# Patient Record
Sex: MALE | Race: WHITE | Employment: OTHER | URBAN - METROPOLITAN AREA
[De-identification: names, ages, dates, MRNs, and addresses within clinical notes are randomized per-mention and may not be internally consistent; named-entity substitution may affect disease eponyms.]

---

## 2017-04-12 DIAGNOSIS — E78.5 HYPERLIPIDEMIA, UNSPECIFIED HYPERLIPIDEMIA TYPE: ICD-10-CM

## 2017-04-12 DIAGNOSIS — E83.52 SERUM CALCIUM ELEVATED: ICD-10-CM

## 2017-04-12 DIAGNOSIS — E78.00 PURE HYPERCHOLESTEROLEMIA: Primary | ICD-10-CM

## 2017-04-12 DIAGNOSIS — R74.8 ELEVATED LIVER ENZYMES: ICD-10-CM

## 2017-05-06 DIAGNOSIS — F43.0 STRESS REACTION: ICD-10-CM

## 2017-05-06 DIAGNOSIS — F41.9 ANXIETY: ICD-10-CM

## 2017-05-15 ENCOUNTER — OFFICE VISIT (OUTPATIENT)
Dept: FAMILY MEDICINE CLINIC | Age: 57
End: 2017-05-15

## 2017-05-15 VITALS
HEART RATE: 53 BPM | BODY MASS INDEX: 25.96 KG/M2 | WEIGHT: 161.5 LBS | SYSTOLIC BLOOD PRESSURE: 119 MMHG | TEMPERATURE: 97 F | RESPIRATION RATE: 20 BRPM | HEIGHT: 66 IN | DIASTOLIC BLOOD PRESSURE: 80 MMHG

## 2017-05-15 DIAGNOSIS — R05.9 COUGH: ICD-10-CM

## 2017-05-15 DIAGNOSIS — F43.0 STRESS REACTION: ICD-10-CM

## 2017-05-15 DIAGNOSIS — F41.9 ANXIETY: Primary | ICD-10-CM

## 2017-05-15 RX ORDER — SERTRALINE HYDROCHLORIDE 50 MG/1
TABLET, FILM COATED ORAL
Qty: 90 TAB | Refills: 1 | Status: SHIPPED | OUTPATIENT
Start: 2017-05-15

## 2017-05-15 NOTE — PROGRESS NOTES
Chief Complaint   Patient presents with    Hypogonadism    Anxiety     stress       Health Maintenance reviewed     1. Have you been to the ER, urgent care clinic since your last visit? Hospitalized since your last visit? No    2. Have you seen or consulted any other health care providers outside of the [facility] since your last visit? Include any pap smears or colon screening.  No

## 2017-05-15 NOTE — MR AVS SNAPSHOT
Visit Information Date & Time Provider Department Dept. Phone Encounter #  
 5/15/2017  1:15 PM Tk Klein MD [address] [phone] Follow-up Instructions Return in about 3 months (around 8/15/2017). Upcoming Health Maintenance Date Due DTaP/Tdap/Td series (1 - Tdap) 5/19/1980 FOBT Q 1 YEAR AGE 50-75 5/19/2009 INFLUENZA AGE 9 TO ADULT 8/1/2017 Allergies as of 5/15/2017  Review Complete On: 5/15/2017 By: Tk Klein MD  
 No Known Allergies Current Immunizations  Never Reviewed Name Date Influenza Vaccine (Quad) PF 8/23/2016 Not reviewed this visit You Were Diagnosed With   
  
 Codes Comments Anxiety    -  Primary ICD-10-CM: F41.9 ICD-9-CM: 300.00 Stress reaction     ICD-10-CM: F43.0 ICD-9-CM: 308.9 Cough     ICD-10-CM: R05 ICD-9-CM: 239. 2 Vitals BP Pulse Temp Resp Height(growth percentile) Weight(growth percentile) 119/80 (BP 1 Location: Right arm, BP Patient Position: Sitting) (!) 53 97 °F (36.1 °C) (Oral) 20 5' 6\" (1.676 m) 161 lb 8 oz (73.3 kg) BMI Smoking Status 26.07 kg/m2 Never Smoker BMI and BSA Data Body Mass Index Body Surface Area 26.07 kg/m 2 1.85 m 2 Preferred Pharmacy Pharmacy Name Phone [phone] [facility] [place], [address] [phone] Your Updated Medication List  
  
   
This list is accurate as of: 5/15/17  2:24 PM.  Always use your most recent med list.  
  
  
  
  
 aspirin delayed-release 81 mg tablet Take  by mouth daily. ibuprofen 800 mg tablet Commonly known as:  MOTRIN Take 1 Tab by mouth every eight (8) hours as needed for Pain. sertraline 50 mg tablet Commonly known as:  ZOLOFT  
TAKE 1 TABLET BY MOUTH DAILY Follow-up Instructions Return in about 3 months (around 8/15/2017). Patient Instructions Please contact our office if you have any questions about your visit today. Introducing [facility]! Dear Maru Bennett: Thank you for requesting a [employer] account. Our records indicate that you already have an active [employer] account. You can access your account anytime at [email or web address] Did you know that you can access your hospital and ER discharge instructions at any time in [employer]? You can also review all of your test results from your hospital stay or ER visit. Additional Information If you have questions, please visit the Frequently Asked Questions section of the [employer] website at [email or web address] Remember, Friendsurance is NOT to be used for urgent needs. For medical emergencies, dial 911. Now available from your iPhone and Android! Please provide this summary of care documentation to your next provider. Your primary care clinician is listed as REUBEN MEDINA. If you have any questions after today's visit, please call [phone].

## 2017-05-15 NOTE — PROGRESS NOTES
HISTORY OF PRESENT ILLNESS  Heri Lynch is a 61 y.o. male. Chief Complaint   Patient presents with    Hypogonadism    Anxiety     stress     improved stress some with retiring but still is an anxious person. Not taking zoloft as thought did not need  complains of cough, new no med tried, no f/c sob  HPI  Past Medical History:   Diagnosis Date    Cat allergies     Dander    Chest pain, unspecified     Elevated cholesterol 2010    diet    Hypercalcemia     Pure hypercholesterolemia 11/21/2016 8/16 [id number], HDL88 ASCVD risk 4.7% in 10 yrs- diet good for now, no statins    Skin problem      Current Outpatient Prescriptions   Medication Sig Dispense Refill    sertraline (ZOLOFT) 50 mg tablet TAKE 1 TABLET BY MOUTH DAILY 90 Tab 1    aspirin delayed-release 81 mg tablet Take  by mouth daily.  ibuprofen (MOTRIN) 800 mg tablet Take 1 Tab by mouth every eight (8) hours as needed for Pain. 60 Tab 0     No Known Allergies    ROS  Visit Vitals    /80 (BP 1 Location: Right arm, BP Patient Position: Sitting)    Pulse (!) 53    Temp 97 °F (36.1 °C) (Oral)    Resp 20    Ht 5' 6\" (1.676 m)    Wt 161 lb 8 oz (73.3 kg)    BMI 26.07 kg/m2       Physical Exam  Nursing note and vitals reviewed. Constitutional: He is oriented to person, place, and time. He appears well-developed and well-nourished. No distress. HENT:   Mouth/Throat: Oropharynx is clear and moist.   Neck: No JVD present. No thyromegaly present. Cardiovascular: Normal rate, regular rhythm and normal heart sounds. Pulmonary/Chest: Effort normal and breath sounds normal. No respiratory distress. He has no wheezes. He has no rales. Abdominal: Soft. Bowel sounds are normal. He exhibits no distension. There is no tenderness. There is no rebound. Musculoskeletal: He exhibits no edema and no tenderness. Lymphadenopathy:     He has no cervical adenopathy. Neurological: He is alert and oriented to person, place, and time. Skin: No pallor. Psychiatric: He has a normal mood and affect. His behavior is normal.    ASSESSMENT and PLAN    ICD-10-CM ICD-9-CM    1. Anxiety improved at present but still encourage compliance with med to see if overall helpful F41.9 300.00    2. Stress reaction F43.0 308.9    3. Cough likey benign R05 786. 2    Visit based of time 25 minutes total,  with more than 50% of the face-to-face visit time spent in counseling on Depression, it's treatment, prognosis, management advise, plan and follow-up recommendations. Follow-up Disposition:  Return in about 3 months (around 8/15/2017) for labs and cpe.

## 2017-06-07 DIAGNOSIS — F41.9 ANXIETY: ICD-10-CM

## 2017-06-07 DIAGNOSIS — F43.0 STRESS REACTION: ICD-10-CM

## 2017-06-07 RX ORDER — SERTRALINE HYDROCHLORIDE 50 MG/1
TABLET, FILM COATED ORAL
Qty: 30 TAB | Refills: 0 | Status: SHIPPED | OUTPATIENT
Start: 2017-06-07 | End: 2017-09-26 | Stop reason: SDUPTHER

## 2017-07-12 ENCOUNTER — HOSPITAL ENCOUNTER (OUTPATIENT)
Dept: OTHER | Age: 57
Discharge: HOME | End: 2017-07-12
Payer: COMMERCIAL

## 2017-07-12 DIAGNOSIS — M79.673 FOOT PAIN: ICD-10-CM

## 2017-07-12 PROCEDURE — 73620 X-RAY EXAM OF FOOT: CPT

## 2017-07-28 ENCOUNTER — HOSPITAL ENCOUNTER (OUTPATIENT)
Dept: MRI IMAGING | Age: 57
Discharge: HOME | End: 2017-07-28
Attending: ORTHOPAEDIC SURGERY
Payer: COMMERCIAL

## 2017-07-28 DIAGNOSIS — M76.829 POSTERIOR TIBIAL TENDINITIS: ICD-10-CM

## 2017-07-28 PROCEDURE — 73721 MRI JNT OF LWR EXTRE W/O DYE: CPT

## 2017-09-26 ENCOUNTER — OFFICE VISIT (OUTPATIENT)
Dept: FAMILY MEDICINE CLINIC | Age: 57
End: 2017-09-26

## 2017-09-26 VITALS
HEART RATE: 66 BPM | SYSTOLIC BLOOD PRESSURE: 135 MMHG | RESPIRATION RATE: 20 BRPM | DIASTOLIC BLOOD PRESSURE: 83 MMHG | HEIGHT: 66 IN | TEMPERATURE: 97.8 F

## 2017-09-26 DIAGNOSIS — M10.9 ACUTE GOUT OF LEFT FOOT, UNSPECIFIED CAUSE: ICD-10-CM

## 2017-09-26 DIAGNOSIS — Z51.81 MEDICATION MONITORING ENCOUNTER: ICD-10-CM

## 2017-09-26 DIAGNOSIS — M19.079 ARTHRITIS OF ANKLE OR FOOT, DEGENERATIVE, UNSPECIFIED LATERALITY: ICD-10-CM

## 2017-09-26 DIAGNOSIS — Z98.890 S/P SURGICAL MANIPULATION OF ANKLE JOINT: ICD-10-CM

## 2017-09-26 DIAGNOSIS — R74.8 ELEVATED LIVER ENZYMES: ICD-10-CM

## 2017-09-26 DIAGNOSIS — Z00.00 VISIT FOR WELL MAN HEALTH CHECK: Primary | ICD-10-CM

## 2017-09-26 DIAGNOSIS — Z23 ENCOUNTER FOR IMMUNIZATION: ICD-10-CM

## 2017-09-26 DIAGNOSIS — C44.91 BASAL CELL CARCINOMA: ICD-10-CM

## 2017-09-26 DIAGNOSIS — Z98.890 S/P FOOT SURGERY, RIGHT: ICD-10-CM

## 2017-09-26 DIAGNOSIS — S82.891S ANKLE FRACTURE, RIGHT, SEQUELA: ICD-10-CM

## 2017-09-26 DIAGNOSIS — Z12.11 SCREEN FOR COLON CANCER: ICD-10-CM

## 2017-09-26 DIAGNOSIS — Z98.890 H/O COLONOSCOPY: ICD-10-CM

## 2017-09-26 DIAGNOSIS — D35.1 PARATHYROID ADENOMA: ICD-10-CM

## 2017-09-26 DIAGNOSIS — Z12.5 SCREENING FOR PROSTATE CANCER: ICD-10-CM

## 2017-09-26 DIAGNOSIS — Z13.21 ENCOUNTER FOR VITAMIN DEFICIENCY SCREENING: ICD-10-CM

## 2017-09-26 DIAGNOSIS — E78.5 HYPERLIPIDEMIA, UNSPECIFIED HYPERLIPIDEMIA TYPE: ICD-10-CM

## 2017-09-26 DIAGNOSIS — Z13.820 SCREENING FOR OSTEOPOROSIS: ICD-10-CM

## 2017-09-26 RX ORDER — PROMETHAZINE HYDROCHLORIDE 12.5 MG/1
TABLET ORAL
Refills: 0 | COMMUNITY
Start: 2017-09-11 | End: 2017-11-07 | Stop reason: ALTCHOICE

## 2017-09-26 RX ORDER — OXYCODONE HYDROCHLORIDE 5 MG/1
TABLET ORAL
Refills: 0 | COMMUNITY
Start: 2017-09-11 | End: 2017-11-07 | Stop reason: ALTCHOICE

## 2017-09-26 RX ORDER — KETOROLAC TROMETHAMINE 10 MG/1
TABLET, FILM COATED ORAL
Refills: 0 | COMMUNITY
Start: 2017-09-22 | End: 2017-11-07 | Stop reason: ALTCHOICE

## 2017-09-26 RX ORDER — DOCUSATE SODIUM 100 MG/1
CAPSULE, LIQUID FILLED ORAL
Refills: 1 | COMMUNITY
Start: 2017-09-11

## 2017-09-26 RX ORDER — COLCHICINE 0.6 MG/1
TABLET ORAL
Refills: 0 | COMMUNITY
Start: 2017-09-22 | End: 2017-11-07 | Stop reason: SDUPTHER

## 2017-09-26 NOTE — PROGRESS NOTES
HISTORY OF PRESENT ILLNESS  Heri Deng is a 61 y.o. male. Actually patient was here for physical but has numerous other issues. Hyperlipidemia chronic problem control uncertain as labs are well past due. History of parathyroid adenoma status post resection and patient is concerned about his bones. He had a recent fracture of the ankle found with severe foot and ankle pain and has had foot surgery and is now in a walking boot and physical therapy. The patient feels better but he is concerned about the status of his bones and is asking about testing to be done for this. He was also found to have gout because he went back for another episode of severe pain. HPI  Past Medical History:   Diagnosis Date    Cat allergies     Dander    Chest pain, unspecified     Elevated cholesterol 2010    diet    Hypercalcemia     Pure hypercholesterolemia 11/21/2016 8/16 [id number], HDL88 ASCVD risk 4.7% in 10 yrs- diet good for now, no statins    Skin problem      Current Outpatient Prescriptions   Medication Sig Dispense Refill    sertraline (ZOLOFT) 50 mg tablet TAKE 1 TABLET BY MOUTH DAILY 90 Tab 1    aspirin delayed-release 81 mg tablet Take  by mouth daily.  colchicine 0.6 mg tablet TK 2 TS  PO AT INITIAL START OF  FLARE. REPEAT WITH 1 T 1 HOUR AFTER FIRST DOSE IF NEEDED  0    ketorolac (TORADOL) 10 mg tablet TK 1 T PO TID UTD  0     mg capsule TK 1 C PO BID  1    oxyCODONE IR (ROXICODONE) 5 mg immediate release tablet TK 1 TO 2 TS PO Q 4 TO 6 H PRF POST-OP PAIN  0    promethazine (PHENERGAN) 12.5 mg tablet TK 1 T PO  Q 6 H  0     No Known Allergies    Review of Systems   Constitutional: Negative for chills and fever. Respiratory: Negative for shortness of breath. Cardiovascular: Negative for chest pain. Musculoskeletal: Positive for joint pain. All other systems reviewed and are negative.     Visit Vitals    /83 (BP 1 Location: Right arm, BP Patient Position: Sitting)    Pulse 66    Temp 97.8 °F (36.6 °C) (Oral)    Resp 20    Ht 5' 6\" (1.676 m)      Physical Exam  Nursing note and vitals reviewed. Constitutional: He is oriented to person, place, and time. He appears well-developed and well-nourished. No distress. HENT:   Mouth/Throat: Oropharynx is clear and moist.   Neck: No JVD present. No thyromegaly present. Cardiovascular: Normal rate, regular rhythm and normal heart sounds. Pulmonary/Chest: Effort normal and breath sounds normal. No respiratory distress. He has no wheezes. He has no rales. Abdominal: Soft. Bowel sounds are normal. He exhibits no distension. There is no tenderness. There is no rebound. Musculoskeletal: He exhibits no edema and no tenderness. Walking boot  Lymphadenopathy:     He has no cervical adenopathy. Neurological: He is alert and oriented to person, place, and time. Skin: No pallor. Psychiatric: He has a normal mood and affect. His behavior is normal.     ASSESSMENT and PLAN    ICD-10-CM ICD-9-CM    6. Ankle fracture, right, sequela  recent spurious ankle fracture, h/o parathyroid adenoma. s/p resection, screening for osteoporosis   S82.891S 905.4 DEXA BONE DENSITY STUDY AXIAL      VITAMIN D, 25 HYDROXY   7. S/P surgical manipulation of ankle joint Z98.890 V45.89    8. S/P foot surgery, right Z98.890 V45.89    9. Arthritis of ankle or foot, degenerative, unspecified laterality M19.079 715.97    10. Acute gout of left foot, unspecified cause U42.7 858.18 METABOLIC PANEL, COMPREHENSIVE      CBC WITH AUTOMATED DIFF   11. Hyperlipidemia, unspecified hyperlipidemia type E78.5 272.4 LIPID PANEL      METABOLIC PANEL, COMPREHENSIVE      CBC WITH AUTOMATED DIFF      VITAMIN B12      TSH 3RD GENERATION      T4, FREE      MAGNESIUM   12. Elevated liver enzymes A29.5 598.1 METABOLIC PANEL, COMPREHENSIVE      CBC WITH AUTOMATED DIFF      VITAMIN B12      MAGNESIUM   13.  Parathyroid adenoma, S/P resection 11/2013 D35.1 227.1 DEXA BONE DENSITY STUDY AXIAL      METABOLIC PANEL, COMPREHENSIVE      CBC WITH AUTOMATED DIFF      VITAMIN B12      TSH 3RD GENERATION      T4, FREE      MAGNESIUM   14. Basal cell carcinoma, upper chest C44.91 173.91    15. Medication monitoring encounter D00.26 O56.67 METABOLIC PANEL, COMPREHENSIVE      CBC WITH AUTOMATED DIFF      VITAMIN B12      TSH 3RD GENERATION      T4, FREE      MAGNESIUM   16. Screening for osteoporosis Z13.820 V82.81 DEXA BONE DENSITY STUDY AXIAL      METABOLIC PANEL, COMPREHENSIVE   17. Encounter for vitamin deficiency screening Z13.21 V77.99 VITAMIN B12      MAGNESIUM      VITAMIN D, 25 HYDROXY     Visit based of time 45 minutes total, in addition to cpe with more than 50% of the face-to-face visit time spent in counseling on numerous issues above, foot ankle surgery and fx, record review, chronic rproblems, it's treatment, prognosis, management advise, plan and follow-up recommendations. Subjective:   Heri Robertson. is a 61 y.o. male presenting for his annual checkup. ROS:  Feeling well. No dyspnea or chest pain on exertion. No abdominal pain, change in bowel habits, black or bloody stools. No urinary tract or prostatic symptoms. No neurological complaints. Specific concerns today: see additional note. Current Outpatient Prescriptions   Medication Sig Dispense Refill    sertraline (ZOLOFT) 50 mg tablet TAKE 1 TABLET BY MOUTH DAILY 90 Tab 1    aspirin delayed-release 81 mg tablet Take  by mouth daily.  colchicine 0.6 mg tablet TK 2 TS  PO AT INITIAL START OF  FLARE.  REPEAT WITH 1 T 1 HOUR AFTER FIRST DOSE IF NEEDED  0    ketorolac (TORADOL) 10 mg tablet TK 1 T PO TID UTD  0     mg capsule TK 1 C PO BID  1    oxyCODONE IR (ROXICODONE) 5 mg immediate release tablet TK 1 TO 2 TS PO Q 4 TO 6 H PRF POST-OP PAIN  0    promethazine (PHENERGAN) 12.5 mg tablet TK 1 T PO  Q 6 H  0     No Known Allergies  Past Medical History:   Diagnosis Date    Cat allergies Dander    Chest pain, unspecified     Elevated cholesterol 2010    diet    Hypercalcemia     Pure hypercholesterolemia 11/21/2016 8/16 AHT924, HDL88 ASCVD risk 4.7% in 10 yrs- diet good for now, no statins    Skin problem      Past Surgical History:   Procedure Laterality Date    FOOT/TOES SURGERY PROC UNLISTED  2/2011    [place] of left foot    HX ORTHOPAEDIC      prior left foot surgery 2010    HX PARATHYROIDECTOMY  2013    partial     Family History   Problem Relation Age of Onset    Diabetes Maternal Grandfather 76    Dementia Other     Heart Attack Neg Hx     Stroke Neg Hx      Social History   Substance Use Topics    Smoking status: Never Smoker    Smokeless tobacco: Former User     Types: Chew     Quit date: 1/31/1999    Alcohol use 3.6 oz/week     6 Cans of beer per week      Comment: social             Objective:     Visit Vitals    /83 (BP 1 Location: Right arm, BP Patient Position: Sitting)    Pulse 66    Temp 97.8 °F (36.6 °C) (Oral)    Resp 20    Ht 5' 6\" (1.676 m)     The patient appears well, alert, oriented x 3, in no distress. ENT normal.  Neck supple. No adenopathy or thyromegaly. WILLIE. Lungs are clear, good air entry, no wheezes, rhonchi or rales. S1 and S2 normal, no murmurs, regular rate and rhythm. Abdomen is soft without tenderness, guarding, mass or organomegaly.  exam: no penile lesions or discharge, no testicular masses or tenderness, no hernias, rectum normal, no masses, prostate normal size, symmetric, no nodules or tenderness, guaiac negative brown stool. Extremities show no edema, normal peripheral pulses. Neurological is normal without focal findings. Assessment/Plan:   healthy adult male  . ICD-10-CM ICD-9-CM    1. Visit for well man health check Z00.00 V70.0 AMB POC FECAL BLOOD, OCCULT, QL 1 CARD   2.  Screening for prostate cancer Z12.5 V76.44 PSA, DIAGNOSTIC (PROSTATE SPECIFIC AG)   3. Screen for colon cancer Z12.11 V76.51 OCCULT BLOOD, IMMUNOASSAY (FIT)      AMB POC FECAL BLOOD, OCCULT, QL 1 CARD   4. H/O colonoscopy, Dr. Morris [place], 2012, normal, repeat 10 years Z98.890 V45.89    5.  Encounter for immunization Z23 V03.89 INFLUENZA VIRUS VAC QUAD,SPLIT,PRESV FREE SYRINGE IM

## 2017-09-26 NOTE — PROGRESS NOTES
Chief Complaint   Patient presents with    Complete Physical       Health Maintenance Due   Topic Date Due    DTaP/Tdap/Td series (1 - Tdap) 05/19/1980    FOBT Q 1 YEAR AGE 50-75  05/19/2009    INFLUENZA AGE 9 TO ADULT  08/01/2017       Health Maintenance reviewed     1. Have you been to the ER, urgent care clinic since your last visit? Hospitalized since your last visit? Yes When: 9/17 Where: [place] [facility] Reason for visit: foot surgery    2. Have you seen or consulted any other health care providers outside of the [address] [place] [address] since your last visit? Include any pap smears or colon screening.  Yes When: 9/17 Where: ortho Reason for visit: ov

## 2017-09-26 NOTE — PATIENT INSTRUCTIONS
Please contact our office if you have any questions about your visit today. Well Visit, Men 48 to 72: Care Instructions  Your Care Instructions  Physical exams can help you stay healthy. Your doctor has checked your overall health and may have suggested ways to take good care of yourself. He or she also may have recommended tests. At home, you can help prevent illness with healthy eating, regular exercise, and other steps. Follow-up care is a key part of your treatment and safety. Be sure to make and go to all appointments, and call your doctor if you are having problems. It's also a good idea to know your test results and keep a list of the medicines you take. How can you care for yourself at home? · Reach and stay at a healthy weight. This will lower your risk for many problems, such as obesity, diabetes, heart disease, and high blood pressure. · Get at least 30 minutes of exercise on most days of the week. Walking is a good choice. You also may want to do other activities, such as running, swimming, cycling, or playing tennis or team sports. · Do not smoke. Smoking can make health problems worse. If you need help quitting, talk to your doctor about stop-smoking programs and medicines. These can increase your chances of quitting for good. · Protect your skin from too much sun. When you're outdoors from 10 a.m. to 4 p.m., stay in the shade or cover up with clothing and a hat with a wide brim. Wear sunglasses that block UV rays. Even when it's cloudy, put broad-spectrum sunscreen (SPF 30 or higher) on any exposed skin. · See a dentist one or two times a year for checkups and to have your teeth cleaned. · Wear a seat belt in the car. · Limit alcohol to 2 drinks a day. Too much alcohol can cause health problems. Follow your doctor's advice about when to have certain tests. These tests can spot problems early. · Cholesterol.  Your doctor will tell you how often to have this done based on your overall health and other things that can increase your risk for heart attack and stroke. · Blood pressure. Have your blood pressure checked during a routine doctor visit. Your doctor will tell you how often to check your blood pressure based on your age, your blood pressure results, and other factors. · Prostate exam. Talk to your doctor about whether you should have a blood test (called a PSA test) for prostate cancer. Experts disagree on whether men should have this test. Some experts recommend that you discuss the benefits and risks of the test with your doctor. · Diabetes. Ask your doctor whether you should have tests for diabetes. · Vision. Some experts recommend that you have yearly exams for glaucoma and other age-related eye problems starting at age 48. · Hearing. Tell your doctor if you notice any change in your hearing. You can have tests to find out how well you hear. · Colon cancer. You should begin tests for colon cancer at age 48. You may have one of several tests. Your doctor will tell you how often to have tests based on your age and risk. Risks include whether you already had a precancerous polyp removed from your colon or whether your parent, brother, sister, or child has had colon cancer. · Heart attack and stroke risk. At least every 4 to 6 years, you should have your risk for heart attack and stroke assessed. Your doctor uses factors such as your age, blood pressure, cholesterol, and whether you smoke or have diabetes to show what your risk for a heart attack or stroke is over the next 10 years. · Abdominal aortic aneurysm. Ask your doctor whether you should have a test to check for an aneurysm. You may need a test if you ever smoked or if your parent, brother, sister, or child has had an aneurysm. When should you call for help? Watch closely for changes in your health, and be sure to contact your doctor if you have any problems or symptoms that concern you. Where can you learn more?   Go to http://chel-jaye.info/. Enter O800 in the search box to learn more about \"Well Visit, Men 48 to 72: Care Instructions. \"  Current as of: July 19, 2016  Content Version: 11.3  © 6886-6980 [employer]. Care instructions adapted under license by [employer] (which disclaims liability or warranty for this information). If you have questions about a medical condition or this instruction, always ask your healthcare professional. [place] [address] any warranty or liability for your use of this information.

## 2017-09-26 NOTE — PROGRESS NOTES
Heri Yates. 1960 male who presents for routine immunizations. Patient denies any symptoms , reactions or allergies that would exclude them from being immunized today. Risks and adverse reactions were discussed and the VIS was given to them. All questions were addressed. Order placed for FLU,  per Verbal Order from [facility] with read back. Patient was observed for 15 min post injection. There were no reactions observed.     Mitchell Samson LPN

## 2017-09-26 NOTE — MR AVS SNAPSHOT
Visit Information Date & Time Provider Department Dept. Phone Encounter #  
 9/26/2017  2:00 PM Micah Agee, [address]  Follow-up Instructions Return in about 6 weeks (around 11/7/2017). Upcoming Health Maintenance Date Due DTaP/Tdap/Td series (1 - Tdap) 5/19/1980 FOBT Q 1 YEAR AGE 50-75 5/19/2009 Allergies as of 9/26/2017  Review Complete On: 9/26/2017 By: Micah Agee MD  
 No Known Allergies Current Immunizations  Never Reviewed Name Date Influenza Vaccine (Quad) PF 9/26/2017, 8/23/2016 Not reviewed this visit You Were Diagnosed With   
  
 Codes Comments Visit for well man health check    -  Primary ICD-10-CM: Z00.00 ICD-9-CM: V70.0 Screening for prostate cancer     ICD-10-CM: Z12.5 ICD-9-CM: V76.44 Screen for colon cancer     ICD-10-CM: Z12.11 ICD-9-CM: V76.51   
 H/O colonoscopy     ICD-10-CM: Z98.890 ICD-9-CM: V45.89 Encounter for immunization     ICD-10-CM: N99 ICD-9-CM: V03.89 Ankle fracture, right, sequela     ICD-10-CM: S82.891S ICD-9-CM: 905.4 S/P surgical manipulation of ankle joint     ICD-10-CM: N10.065 ICD-9-CM: V45.89 S/P foot surgery, right     ICD-10-CM: L30.160 ICD-9-CM: V45.89 Arthritis of ankle or foot, degenerative, unspecified laterality     ICD-10-CM: M19.079 ICD-9-CM: 715.97 Acute gout of left foot, unspecified cause     ICD-10-CM: M10.9 ICD-9-CM: 274.01 Hyperlipidemia, unspecified hyperlipidemia type     ICD-10-CM: E78.5 ICD-9-CM: 272.4 Elevated liver enzymes     ICD-10-CM: R74.8 ICD-9-CM: 790.5 Parathyroid adenoma     ICD-10-CM: D35.1 ICD-9-CM: 227.1 Basal cell carcinoma     ICD-10-CM: C44.91 
ICD-9-CM: 173.91 Medication monitoring encounter     ICD-10-CM: Z51.81 
ICD-9-CM: V58.83 Screening for osteoporosis     ICD-10-CM: Z13.820 ICD-9-CM: V82.81   
 Encounter for vitamin deficiency screening     ICD-10-CM: Z13.21 ICD-9-CM: V77.99 Vitals BP Pulse Temp Resp Height(growth percentile) Smoking Status 135/83 (BP 1 Location: Right arm, BP Patient Position: Sitting) 66 97.8 °F (36.6 °C) (Oral) 20 5' 6\" (1.676 m) Never Smoker Preferred Pharmacy Pharmacy Name Phone [phone], [address] [facility] ([place], [place]) [address] AT [address] [phone] Your Updated Medication List  
  
   
This list is accurate as of: 9/26/17  3:53 PM.  Always use your most recent med list.  
  
  
  
  
 aspirin delayed-release 81 mg tablet Take  by mouth daily. colchicine 0.6 mg tablet TK 2 TS  PO AT INITIAL START OF  FLARE. REPEAT WITH 1 T 1 HOUR AFTER FIRST DOSE IF NEEDED  
  
  mg capsule Generic drug:  docusate sodium TK 1 C PO BID  
  
 ketorolac 10 mg tablet Commonly known as:  TORADOL TK 1 T PO TID UTD  
  
 oxyCODONE IR 5 mg immediate release tablet Commonly known as:  ROXICODONE  
TK 1 TO 2 TS PO Q 4 TO 6 H PRF POST-OP PAIN  
  
 promethazine 12.5 mg tablet Commonly known as:  PHENERGAN  
TK 1 T PO  Q 6 H  
  
 sertraline 50 mg tablet Commonly known as:  ZOLOFT  
TAKE 1 TABLET BY MOUTH DAILY We Performed the Following AMB POC FECAL BLOOD, OCCULT, QL 1 CARD [71822 CPT(R)] INFLUENZA VIRUS VAC QUAD,SPLIT,PRESV FREE SYRINGE IM [id number] CPT(R)] Follow-up Instructions Return in about 6 weeks (around 11/7/2017). To-Do List   
 09/26/2017 Imaging:  DEXA BONE DENSITY STUDY AXIAL   
  
 10/26/2017 Lab:  CBC WITH AUTOMATED DIFF   
  
 10/26/2017 Lab:  LIPID PANEL   
  
 10/26/2017 Lab:  MAGNESIUM   
  
 10/26/2017 Lab:  METABOLIC PANEL, COMPREHENSIVE   
  
 10/26/2017 Lab:  OCCULT BLOOD, IMMUNOASSAY (FIT)   
  
 10/26/2017 Lab:  PSA, DIAGNOSTIC (PROSTATE SPECIFIC AG) 10/26/2017 Lab:  T4, FREE   
  
 10/26/2017 Lab:  TSH 3RD GENERATION   
  
 10/26/2017 Lab:  VITAMIN B12   
  
 10/26/2017 Lab:  VITAMIN D, 25 HYDROXY Patient Instructions Please contact our office if you have any questions about your visit today. Well Visit, Men 48 to 72: Care Instructions Your Care Instructions Physical exams can help you stay healthy. Your doctor has checked your overall health and may have suggested ways to take good care of yourself. He or she also may have recommended tests. At home, you can help prevent illness with healthy eating, regular exercise, and other steps. Follow-up care is a key part of your treatment and safety. Be sure to make and go to all appointments, and call your doctor if you are having problems. It's also a good idea to know your test results and keep a list of the medicines you take. How can you care for yourself at home? · Reach and stay at a healthy weight. This will lower your risk for many problems, such as obesity, diabetes, heart disease, and high blood pressure. · Get at least 30 minutes of exercise on most days of the week. Walking is a good choice. You also may want to do other activities, such as running, swimming, cycling, or playing tennis or team sports. · Do not smoke. Smoking can make health problems worse. If you need help quitting, talk to your doctor about stop-smoking programs and medicines. These can increase your chances of quitting for good. · Protect your skin from too much sun. When you're outdoors from 10 a.m. to 4 p.m., stay in the shade or cover up with clothing and a hat with a wide brim. Wear sunglasses that block UV rays. Even when it's cloudy, put broad-spectrum sunscreen (SPF 30 or higher) on any exposed skin. · See a dentist one or two times a year for checkups and to have your teeth cleaned. · Wear a seat belt in the car. · Limit alcohol to 2 drinks a day. Too much alcohol can cause health problems. Follow your doctor's advice about when to have certain tests. These tests can spot problems early. · Cholesterol. Your doctor will tell you how often to have this done based on your overall health and other things that can increase your risk for heart attack and stroke. · Blood pressure. Have your blood pressure checked during a routine doctor visit. Your doctor will tell you how often to check your blood pressure based on your age, your blood pressure results, and other factors. · Prostate exam. Talk to your doctor about whether you should have a blood test (called a PSA test) for prostate cancer. Experts disagree on whether men should have this test. Some experts recommend that you discuss the benefits and risks of the test with your doctor. · Diabetes. Ask your doctor whether you should have tests for diabetes. · Vision. Some experts recommend that you have yearly exams for glaucoma and other age-related eye problems starting at age 48. · Hearing. Tell your doctor if you notice any change in your hearing. You can have tests to find out how well you hear. · Colon cancer. You should begin tests for colon cancer at age 48. You may have one of several tests. Your doctor will tell you how often to have tests based on your age and risk. Risks include whether you already had a precancerous polyp removed from your colon or whether your parent, brother, sister, or child has had colon cancer. · Heart attack and stroke risk. At least every 4 to 6 years, you should have your risk for heart attack and stroke assessed. Your doctor uses factors such as your age, blood pressure, cholesterol, and whether you smoke or have diabetes to show what your risk for a heart attack or stroke is over the next 10 years. · Abdominal aortic aneurysm. Ask your doctor whether you should have a test to check for an aneurysm. You may need a test if you ever smoked or if your parent, brother, sister, or child has had an aneurysm. When should you call for help? Watch closely for changes in your health, and be sure to contact your doctor if you have any problems or symptoms that concern you. Where can you learn more? Go to http://chel-jaye.info/. Enter D449 in the search box to learn more about \"Well Visit, Men 48 to 72: Care Instructions. \" Current as of: July 19, 2016 Content Version: 11.3 © 1738-2989 [employer]. Care instructions adapted under license by [employer] (which disclaims liability or warranty for this information). If you have questions about a medical condition or this instruction, always ask your healthcare professional. [address] any warranty or liability for your use of this information. Introducing [facility]! Dear Madie Lechuga: Thank you for requesting a [employer] account. Our records indicate that you already have an active [employer] account. You can access your account anytime at [email or web address] Did you know that you can access your hospital and ER discharge instructions at any time in [employer]? You can also review all of your test results from your hospital stay or ER visit. Additional Information If you have questions, please visit the Frequently Asked Questions section of the [employer] website at [email or web address]/. Remember, [employer] is NOT to be used for urgent needs. For medical emergencies, dial 911. Now available from your iPhone and Android! Please provide this summary of care documentation to your next provider. Your primary care clinician is listed as REUBEN MEDINA. If you have any questions after today's visit, please call [phone].

## 2017-10-04 ENCOUNTER — HOSPITAL ENCOUNTER (OUTPATIENT)
Dept: BONE DENSITY | Age: 57
Discharge: HOME | End: 2017-10-04
Attending: FAMILY MEDICINE
Payer: COMMERCIAL

## 2017-10-04 DIAGNOSIS — D35.1 PARATHYROID ADENOMA: ICD-10-CM

## 2017-10-04 DIAGNOSIS — Z13.820 SCREENING FOR OSTEOPOROSIS: ICD-10-CM

## 2017-10-04 DIAGNOSIS — S82.891S ANKLE FRACTURE, RIGHT, SEQUELA: ICD-10-CM

## 2017-10-04 PROCEDURE — 77080 DXA BONE DENSITY AXIAL: CPT

## 2017-10-30 ENCOUNTER — HOSPITAL ENCOUNTER (OUTPATIENT)
Dept: LAB | Age: 57
Discharge: HOME | End: 2017-10-30
Payer: COMMERCIAL

## 2017-10-30 DIAGNOSIS — Z12.11 SCREEN FOR COLON CANCER: ICD-10-CM

## 2017-10-30 PROCEDURE — 82274 ASSAY TEST FOR BLOOD FECAL: CPT | Performed by: FAMILY MEDICINE

## 2017-10-31 LAB — HEMOCCULT STL QL IA: NEGATIVE

## 2017-11-03 ENCOUNTER — HOSPITAL ENCOUNTER (OUTPATIENT)
Dept: LAB | Age: 57
Discharge: HOME | End: 2017-11-03
Payer: COMMERCIAL

## 2017-11-03 DIAGNOSIS — Z12.5 SCREENING FOR PROSTATE CANCER: ICD-10-CM

## 2017-11-03 DIAGNOSIS — Z51.81 MEDICATION MONITORING ENCOUNTER: ICD-10-CM

## 2017-11-03 DIAGNOSIS — M10.9 ACUTE GOUT OF LEFT FOOT, UNSPECIFIED CAUSE: ICD-10-CM

## 2017-11-03 DIAGNOSIS — R74.8 ELEVATED LIVER ENZYMES: ICD-10-CM

## 2017-11-03 DIAGNOSIS — D35.1 PARATHYROID ADENOMA: ICD-10-CM

## 2017-11-03 DIAGNOSIS — S82.891S ANKLE FRACTURE, RIGHT, SEQUELA: ICD-10-CM

## 2017-11-03 DIAGNOSIS — Z13.21 ENCOUNTER FOR VITAMIN DEFICIENCY SCREENING: ICD-10-CM

## 2017-11-03 DIAGNOSIS — Z13.820 SCREENING FOR OSTEOPOROSIS: ICD-10-CM

## 2017-11-03 DIAGNOSIS — E78.5 HYPERLIPIDEMIA, UNSPECIFIED HYPERLIPIDEMIA TYPE: ICD-10-CM

## 2017-11-03 LAB
25(OH)D3 SERPL-MCNC: 29.4 NG/ML (ref 30–100)
ALBUMIN SERPL-MCNC: 4.1 G/DL (ref 3.4–5)
ALBUMIN/GLOB SERPL: 1.4 {RATIO} (ref 0.8–1.7)
ALP SERPL-CCNC: 163 U/L (ref 45–117)
ALT SERPL-CCNC: 97 U/L (ref 16–61)
ANION GAP SERPL CALC-SCNC: 8 MMOL/L (ref 3–18)
AST SERPL-CCNC: 155 U/L (ref 15–37)
BASOPHILS # BLD: 0 K/UL (ref 0–0.06)
BASOPHILS NFR BLD: 0 % (ref 0–2)
BILIRUB SERPL-MCNC: 0.6 MG/DL (ref 0.2–1)
BUN SERPL-MCNC: 17 MG/DL (ref 7–18)
BUN/CREAT SERPL: 19 (ref 12–20)
CALCIUM SERPL-MCNC: 8.7 MG/DL (ref 8.5–10.1)
CHLORIDE SERPL-SCNC: 102 MMOL/L (ref 100–108)
CHOLEST SERPL-MCNC: 228 MG/DL
CO2 SERPL-SCNC: 28 MMOL/L (ref 21–32)
CREAT SERPL-MCNC: 0.91 MG/DL (ref 0.6–1.3)
DIFFERENTIAL METHOD BLD: ABNORMAL
EOSINOPHIL # BLD: 0.3 K/UL (ref 0–0.4)
EOSINOPHIL NFR BLD: 5 % (ref 0–5)
ERYTHROCYTE [DISTWIDTH] IN BLOOD BY AUTOMATED COUNT: 13.4 % (ref 11.6–14.5)
GLOBULIN SER CALC-MCNC: 3 G/DL (ref 2–4)
GLUCOSE SERPL-MCNC: 107 MG/DL (ref 74–99)
HCT VFR BLD AUTO: 42.9 % (ref 36–48)
HDLC SERPL-MCNC: 87 MG/DL (ref 40–60)
HDLC SERPL: 2.6 {RATIO} (ref 0–5)
HGB BLD-MCNC: 14.3 G/DL (ref 13–16)
LDLC SERPL CALC-MCNC: 120.8 MG/DL (ref 0–100)
LIPID PROFILE,FLP: ABNORMAL
LYMPHOCYTES # BLD: 1.7 K/UL (ref 0.9–3.6)
LYMPHOCYTES NFR BLD: 26 % (ref 21–52)
MAGNESIUM SERPL-MCNC: 2.1 MG/DL (ref 1.6–2.6)
MCH RBC QN AUTO: 30.9 PG (ref 24–34)
MCHC RBC AUTO-ENTMCNC: 33.3 G/DL (ref 31–37)
MCV RBC AUTO: 92.7 FL (ref 74–97)
MONOCYTES # BLD: 0.5 K/UL (ref 0.05–1.2)
MONOCYTES NFR BLD: 8 % (ref 3–10)
NEUTS SEG # BLD: 3.9 K/UL (ref 1.8–8)
NEUTS SEG NFR BLD: 61 % (ref 40–73)
PLATELET # BLD AUTO: 271 K/UL (ref 135–420)
PMV BLD AUTO: 9.9 FL (ref 9.2–11.8)
POTASSIUM SERPL-SCNC: 4.3 MMOL/L (ref 3.5–5.5)
PROT SERPL-MCNC: 7.1 G/DL (ref 6.4–8.2)
PSA SERPL-MCNC: 1.5 NG/ML (ref 0–4)
RBC # BLD AUTO: 4.63 M/UL (ref 4.7–5.5)
SODIUM SERPL-SCNC: 138 MMOL/L (ref 136–145)
T4 FREE SERPL-MCNC: 1.2 NG/DL (ref 0.7–1.5)
TRIGL SERPL-MCNC: 101 MG/DL (ref ?–150)
TSH SERPL DL<=0.05 MIU/L-ACNC: 3.35 UIU/ML (ref 0.36–3.74)
VIT B12 SERPL-MCNC: 527 PG/ML (ref 211–911)
VLDLC SERPL CALC-MCNC: 20.2 MG/DL
WBC # BLD AUTO: 6.4 K/UL (ref 4.6–13.2)

## 2017-11-03 PROCEDURE — 84443 ASSAY THYROID STIM HORMONE: CPT | Performed by: FAMILY MEDICINE

## 2017-11-03 PROCEDURE — 82607 VITAMIN B-12: CPT | Performed by: FAMILY MEDICINE

## 2017-11-03 PROCEDURE — 80053 COMPREHEN METABOLIC PANEL: CPT | Performed by: FAMILY MEDICINE

## 2017-11-03 PROCEDURE — 80061 LIPID PANEL: CPT | Performed by: FAMILY MEDICINE

## 2017-11-03 PROCEDURE — 83735 ASSAY OF MAGNESIUM: CPT | Performed by: FAMILY MEDICINE

## 2017-11-03 PROCEDURE — 85025 COMPLETE CBC W/AUTO DIFF WBC: CPT | Performed by: FAMILY MEDICINE

## 2017-11-03 PROCEDURE — 36415 COLL VENOUS BLD VENIPUNCTURE: CPT | Performed by: FAMILY MEDICINE

## 2017-11-03 PROCEDURE — 84439 ASSAY OF FREE THYROXINE: CPT | Performed by: FAMILY MEDICINE

## 2017-11-03 PROCEDURE — 84153 ASSAY OF PSA TOTAL: CPT | Performed by: FAMILY MEDICINE

## 2017-11-03 PROCEDURE — 82306 VITAMIN D 25 HYDROXY: CPT | Performed by: FAMILY MEDICINE

## 2017-11-07 ENCOUNTER — OFFICE VISIT (OUTPATIENT)
Dept: FAMILY MEDICINE CLINIC | Age: 57
End: 2017-11-07

## 2017-11-07 VITALS
HEART RATE: 64 BPM | RESPIRATION RATE: 20 BRPM | WEIGHT: 166.75 LBS | SYSTOLIC BLOOD PRESSURE: 131 MMHG | BODY MASS INDEX: 26.8 KG/M2 | HEIGHT: 66 IN | TEMPERATURE: 97.3 F | DIASTOLIC BLOOD PRESSURE: 79 MMHG

## 2017-11-07 DIAGNOSIS — M85.80 OSTEOPENIA, UNSPECIFIED LOCATION: ICD-10-CM

## 2017-11-07 DIAGNOSIS — D35.1 PARATHYROID ADENOMA: ICD-10-CM

## 2017-11-07 DIAGNOSIS — R74.8 ELEVATED LIVER ENZYMES: Primary | ICD-10-CM

## 2017-11-07 DIAGNOSIS — E21.0 PRIMARY HYPERPARATHYROIDISM (HCC): ICD-10-CM

## 2017-11-07 DIAGNOSIS — S82.891S CLOSED FRACTURE OF RIGHT ANKLE, SEQUELA: ICD-10-CM

## 2017-11-07 DIAGNOSIS — M10.072 ACUTE IDIOPATHIC GOUT INVOLVING TOE OF LEFT FOOT: ICD-10-CM

## 2017-11-07 RX ORDER — COLCHICINE 0.6 MG/1
0.6 TABLET ORAL
Qty: 30 TAB | Refills: 0 | Status: SHIPPED | OUTPATIENT
Start: 2017-11-07

## 2017-11-07 RX ORDER — IBUPROFEN 800 MG/1
800 TABLET ORAL
Qty: 30 TAB | Refills: 0 | Status: SHIPPED | OUTPATIENT
Start: 2017-11-07

## 2017-11-07 NOTE — PATIENT INSTRUCTIONS
Please contact our office if you have any questions about your visit today. Learning About Osteopenia  What is osteopenia? Osteopenia is a decrease in thickness, or density, in bones. That means the bones become thinner and weaker. It is much more common in women than in men. It is an early form of osteoporosis, a condition in which the bones are so thin and weak that they can break easily. It's important to know that osteopenia is not a disease. It can happen normally with aging. Having osteopenia means that there is a greater risk that you may get osteoporosis. It also means that you are more likely to break a bone than someone who does not have osteopenia. But not everyone with osteopenia gets osteoporosis or breaks a bone. Osteopenia doesn't cause any symptoms. It's usually found with a type of X-ray called a bone density test. Osteopenia means that your bone density result (T-score) is between -1.0 and -2.5. What increases the risk for osteopenia? Things that increase your risk include:  · Having a family history of osteoporosis. · Being thin. · Being white or . · Getting too little physical activity. · Smoking. · Drinking too much alcohol often. · Using certain medicines such as steroids. How can you prevent osteoporosis? There are things you can do to slow down osteopenia and prevent osteoporosis. Certain lifestyle changes will help slow the loss of bone density. · Eat food that has plenty of calcium and vitamin D. Yogurt, cheese, milk, and dark green vegetables are high in calcium. Eggs, fatty fish, cereal, and fortified milk are high in vitamin D.  · Talk to your doctor about taking a calcium supplement that has vitamin D in it. · Get regular exercise. ¨ Do 30 minutes of weight-bearing exercise on most days of the week. Walking, jogging, stair climbing, and dancing are good choices. ¨ Do resistance exercises with weights or elastic bands 2 or 3 days a week.   · Limit alcohol to 2 drinks a day for men and 1 drink a day for women. Too much alcohol can cause health problems. · Do not smoke. Smoking can make bones thin faster. If you need help quitting, talk to your doctor about stop-smoking programs and medicines. These can increase your chances of quitting for good. Prescription medicines are available for treating bone thinning. But these are more often used to treat osteoporosis. Follow-up care is a key part of your treatment and safety. Be sure to make and go to all appointments, and call your doctor if you are having problems. It's also a good idea to know your test results and keep a list of the medicines you take. Where can you learn more? Go to http://chelAccelerated Orthopedic Technologiesjaye.info/. Enter P171 in the search box to learn more about \"Learning About Osteopenia. \"  Current as of: May 12, 2017  Content Version: 11.4  © 3492-9512 [employer]. Care instructions adapted under license by [employer] (which disclaims liability or warranty for this information). If you have questions about a medical condition or this instruction, always ask your healthcare professional. [address] any warranty or liability for your use of this information. Preventing Osteoporosis: Care Instructions  Your Care Instructions    Osteoporosis means the bones are weak and thin enough that they can break easily. The older you are, the more likely you are to get osteoporosis. But with plenty of calcium, vitamin D, and exercise, you can help prevent osteoporosis. The preteen and teen years are a key time for bone building. With the help of calcium, vitamin D, and exercise in those early years and beyond, the bones reach their peak density and strength by age 27. After age 27, your bones naturally start to thin and weaken. The stronger your bones are at around age 27, the lower your risk for osteoporosis.  But no matter what your age and risk are, your bones still need calcium, vitamin D, and exercise to stay strong. Also avoid smoking, and limit alcohol. Smoking and heavy alcohol use can make your bones thinner. Talk to your doctor about any special risks you might have, such as having a close relative with osteoporosis or taking a medicine that can weaken bones. Your doctor can tell you the best ways to protect your bones from thinning. Follow-up care is a key part of your treatment and safety. Be sure to make and go to all appointments, and call your doctor if you are having problems. It's also a good idea to know your test results and keep a list of the medicines you take. How can you care for yourself at home? · Get enough calcium and vitamin D. The [place] of Medicine recommends adults younger than age 46 need 1,000 mg of calcium and 600 IU of vitamin D each day. Women ages 46 to 79 need 1,200 mg of calcium and 600 IU of vitamin D each day. Men ages 46 to 79 need 1,000 mg of calcium and 600 IU of vitamin D each day. Adults 71 and older need 1,200 mg of calcium and 800 IU of vitamin D each day. ¨ Eat foods rich in calcium, like yogurt, cheese, milk, and dark green vegetables. ¨ Eat foods rich in vitamin D, like eggs, fatty fish, cereal, and fortified milk. ¨ Get some sunshine. Your body uses sunshine to make its own vitamin D. The safest time to be out in the sun is before 10 a.m. or after 3 p.m. Avoid getting sunburned. Sunburn can increase your risk of skin cancer. ¨ Talk to your doctor about taking a calcium plus vitamin D supplement. Ask about what type of calcium is right for you, and how much to take at a time. Adults ages 23 to 48 should not get more than 2,500 mg of calcium and 4,000 IU of vitamin D each day, whether it is from supplements and/or food. Adults ages 46 and older should not get more than 2,000 mg of calcium and 4,000 IU of vitamin D each day from supplements and/or food. · Get regular bone-building exercise.  Weight-bearing and resistance exercises keep bones healthy by working the muscles and bones against gravity. Start out at an exercise level that feels right for you. Add a little at a time until you can do the following:  ¨ Do 30 minutes of weight-bearing exercise on most days of the week. Walking, jogging, stair climbing, and dancing are good choices. ¨ Do resistance exercises with weights or elastic bands 2 to 3 days a week. · Limit alcohol. Drink no more than 1 alcohol drink a day if you are a woman. Drink no more than 2 alcohol drinks a day if you are a man. · Do not smoke. Smoking can make bones thin faster. If you need help quitting, talk to your doctor about stop-smoking programs and medicines. These can increase your chances of quitting for good. When should you call for help? Watch closely for changes in your health, and be sure to contact your doctor if you have any problems. Where can you learn more? Go to http://chelPlanZapjaye.info/. Enter H023 in the search box to learn more about \"Preventing Osteoporosis: Care Instructions. \"  Current as of: May 12, 2017  Content Version: 11.4  © 5046-2599 [employer]. Care instructions adapted under license by [employer] (which disclaims liability or warranty for this information). If you have questions about a medical condition or this instruction, always ask your healthcare professional. [address] any warranty or liability for your use of this information. Liver Disease Diet: Care Instructions  Your Care Instructions    The liver does many jobs that are vital to the rest of your body. When something is wrong with the liver, your body may not get the nutrition it needs. It is important that you eat a healthy diet that includes a variety of foods from the basic food groups: grains, vegetables, fruits, dairy, and protein foods.  Follow your doctor's instructions for eating carbohydrate, protein, and fat in the right amounts for you. Your doctor also may limit salt or take salt out of your diet to help protect the liver. Always talk with your doctor or dietitian before you make changes in your diet. Follow-up care is a key part of your treatment and safety. Be sure to make and go to all appointments, and call your doctor if you are having problems. It's also a good idea to know your test results and keep a list of the medicines you take. How can you care for yourself at home? · Work with your doctor or dietitian to create a food plan that guides your daily food choices. · Eat a balanced diet. Do not skip meals or go for many hours without eating. If you eat several small meals during the day, you have a better chance of getting the extra calories your body needs for energy. · Your doctor may recommend a high-carbohydrate diet to get enough calories, since you may have to limit fat. You may need to spread carbohydrate throughout your meals and snacks to control the amount of sugar in your blood. Eat six small meals a day, rather than three big ones. Carbohydrate is found in:  ¨ Whole-grain and refined breads and cereals. ¨ Some vegetables. ¨ Cooked beans (such as kidney or black beans) and peas (such as lentils or split peas). ¨ Fruits. ¨ Low-fat or nonfat milk and milk products, which also supply protein. ¨ Candy, table sugar, and sugary soda drinks (try to limit these). · Follow your doctor's or dietitian's instructions on how to get the right amount of protein in your diet. Examples of animal protein are:  [employer], fish, and poultry. ¨ Eggs. ¨ Milk and milk products. · Your doctor or dietitian may ask you to eat a certain amount of protein that comes from plants (rather than protein that comes from animals). You can get plant protein from foods such as:  ¨ Cooked dried beans and peas. ¨ Peanut butter, nuts, and seeds. ¨ Tofu. · Limit salt, if your doctor tells you to.  This will help prevent fluid buildup in your belly and chest, which can cause serious problems. Salt is in many prepared foods, such as childers, canned foods, snack foods, sauces, and soups. Look for reduced-salt products. · Your doctor may recommend vitamin and mineral supplements. However, do not take any other medicine, including over-the-counter medicines, vitamins, and herbal products, without talking to your doctor first.  · Do NOT take acetaminophen (Tylenol), ibuprofen (Advil, Motrin), or naproxen (Aleve). These can cause more liver damage. · Do not drink any alcohol. It can harm your liver. Talk to your doctor if you need help to stop drinking. · If you have a loss of appetite or have nausea or vomiting, try to:  ¨ Stay away from foods and food smells that make you feel worse. ¨ Avoid greasy or fatty foods. ¨ Eat food that settles your stomach when it feels upset. Try crackers, dry toast, or wendy (wendy tea, hard wendy candy, or crystallized wendy). When should you call for help? Watch closely for changes in your health, and be sure to contact your doctor if you have any problems. Where can you learn more? Go to http://chel-jaye.info/. Enter E214 in the search box to learn more about \"Liver Disease Diet: Care Instructions. \"  Current as of: May 12, 2017  Content Version: 11.4  © 3781-7226 [employer]. Care instructions adapted under license by [employer] (which disclaims liability or warranty for this information). If you have questions about a medical condition or this instruction, always ask your healthcare professional. [address] any warranty or liability for your use of this information.

## 2017-11-07 NOTE — PROGRESS NOTES
Chief Complaint   Patient presents with    Arthritis    Gout    Cholesterol Problem     high chol    Abnormal Lab Results     elevated liver enzymes    Thyroid Problem     parathyroid adenoma    Results     discuss lab and dexa results       Health Maintenance Due   Topic Date Due    DTaP/Tdap/Td series (1 - Tdap) 05/19/1980       Health Maintenance reviewed     1. Have you been to the ER, urgent care clinic since your last visit? Hospitalized since your last visit? No    2. Have you seen or consulted any other health care providers outside of the [address] [place] [address] since your last visit? Include any pap smears or colon screening.  No

## 2017-11-07 NOTE — PROGRESS NOTES
HISTORY OF PRESENT ILLNESS  Everette N Alphonzo Angelucci. is a 61 y.o. male. Chief Complaint   Patient presents with    Arthritis    Gout    Cholesterol Problem     high chol    Abnormal Lab Results     elevated liver enzymes    Thyroid Problem     parathyroid adenoma    Results     discuss lab and dexa results   H/o ankle fracture, states had sig swelling of the left ankle, states some crystals were seen in ankle aspirate  States he had a blood test for gout by ortho and told was normal, given colchicine. , quickly ankle swelling and pain improved, notes that since then the left great toe swells frequently. States he has been taking more motrin as a result, complains of difficulty with walking on the left, took motrin 800 mg    HPI  Past Medical History:   Diagnosis Date    Cat allergies     Dander    Chest pain, unspecified     Elevated cholesterol 2010    diet    Hypercalcemia     Pure hypercholesterolemia 11/21/2016 8/16 [id number], HDL88 ASCVD risk 4.7% in 10 yrs- diet good for now, no statins    Skin problem      Current Outpatient Prescriptions   Medication Sig Dispense Refill    colchicine 0.6 mg tablet TK 2 TS  PO AT INITIAL START OF  FLARE. REPEAT WITH 1 T 1 HOUR AFTER FIRST DOSE IF NEEDED  0    sertraline (ZOLOFT) 50 mg tablet TAKE 1 TABLET BY MOUTH DAILY 90 Tab 1     mg capsule TK 1 C PO BID  1    aspirin delayed-release 81 mg tablet Take  by mouth daily. ,No Known Allergies    Review of Systems   Constitutional: Negative for malaise/fatigue. Gastrointestinal: Negative for abdominal pain, nausea and vomiting. Musculoskeletal: Positive for joint pain. Visit Vitals    /79 (BP 1 Location: Right arm, BP Patient Position: Sitting)    Pulse 64    Temp 97.3 °F (36.3 °C) (Oral)    Resp 20    Ht 5' 6\" (1.676 m)    Wt 166 lb 12 oz (75.6 kg)    BMI 26.91 kg/m2       Physical Exam Nursing note and vitals reviewed. Constitutional: He is oriented to person, place, and time. He appears well-developed and well-nourished. No distress. HENT:   Mouth/Throat: Oropharynx is clear and moist.   Neck: No JVD present. No thyromegaly present. Cardiovascular: Normal rate, regular rhythm and normal heart sounds. Pulmonary/Chest: Effort normal and breath sounds normal. No respiratory distress. He has no wheezes. He has no rales. Abdominal: Soft. Bowel sounds are normal. He exhibits no distension. There is no tenderness. There is no rebound. Musculoskeletal: He exhibits no edema and no tenderness. Lymphadenopathy:     He has no cervical adenopathy. Neurological: He is alert and oriented to person, place, and time. Skin: No pallor. Psychiatric: He has a normal mood and affect. His behavior is normal.     Results for orders placed or performed during the hospital encounter of 11/03/17   PSA, DIAGNOSTIC (PROSTATE SPECIFIC AG)   Result Value Ref Range    Prostate Specific Ag 1.5 0.0 - 4.0 ng/mL   LIPID PANEL   Result Value Ref Range    LIPID PROFILE          Cholesterol, total 228 (H) <200 MG/DL    Triglyceride 101 <150 MG/DL    HDL Cholesterol 87 (H) 40 - 60 MG/DL    LDL, calculated 120.8 (H) 0 - 100 MG/DL    VLDL, calculated 20.2 MG/DL    CHOL/HDL Ratio 2.6 0 - 5.0     METABOLIC PANEL, COMPREHENSIVE   Result Value Ref Range    Sodium 138 136 - 145 mmol/L    Potassium 4.3 3.5 - 5.5 mmol/L    Chloride 102 100 - 108 mmol/L    CO2 28 21 - 32 mmol/L    Anion gap 8 3.0 - 18 mmol/L    Glucose 107 (H) 74 - 99 mg/dL    BUN 17 7.0 - 18 MG/DL    Creatinine 0.91 0.6 - 1.3 MG/DL    BUN/Creatinine ratio 19 12 - 20      GFR est AA >60 >60 ml/min/1.73m2    GFR est non-AA >60 >60 ml/min/1.73m2    Calcium 8.7 8.5 - 10.1 MG/DL    Bilirubin, total 0.6 0.2 - 1.0 MG/DL    ALT (SGPT) 97 (H) 16 - 61 U/L    AST (SGOT) 155 (H) 15 - 37 U/L    Alk.  phosphatase 163 (H) 45 - 117 U/L    Protein, total 7.1 6.4 - 8.2 g/dL    Albumin 4.1 3.4 - 5.0 g/dL    Globulin 3.0 2.0 - 4.0 g/dL    A-G Ratio 1.4 0.8 - 1.7     CBC WITH AUTOMATED DIFF   Result Value Ref Range    WBC 6.4 4.6 - 13.2 K/uL    RBC 4.63 (L) 4.70 - 5.50 M/uL    HGB 14.3 13.0 - 16.0 g/dL    HCT 42.9 36.0 - 48.0 %    MCV 92.7 74.0 - 97.0 FL    MCH 30.9 24.0 - 34.0 PG    MCHC 33.3 31.0 - 37.0 g/dL    RDW 13.4 11.6 - 14.5 %    PLATELET 422 002 - 074 K/uL    MPV 9.9 9.2 - 11.8 FL    NEUTROPHILS 61 40 - 73 %    LYMPHOCYTES 26 21 - 52 %    MONOCYTES 8 3 - 10 %    EOSINOPHILS 5 0 - 5 %    BASOPHILS 0 0 - 2 %    ABS. NEUTROPHILS 3.9 1.8 - 8.0 K/UL    ABS. LYMPHOCYTES 1.7 0.9 - 3.6 K/UL    ABS. MONOCYTES 0.5 0.05 - 1.2 K/UL    ABS. EOSINOPHILS 0.3 0.0 - 0.4 K/UL    ABS. BASOPHILS 0.0 0.0 - 0.06 K/UL    DF AUTOMATED     VITAMIN B12   Result Value Ref Range    Vitamin B12 527 211 - 911 pg/mL   TSH 3RD GENERATION   Result Value Ref Range    TSH 3.35 0.36 - 3.74 uIU/mL   T4, FREE   Result Value Ref Range    T4, Free 1.2 0.7 - 1.5 NG/DL   MAGNESIUM   Result Value Ref Range    Magnesium 2.1 1.6 - 2.6 mg/dL   VITAMIN D, 25 HYDROXY   Result Value Ref Range    Vitamin D 25-Hydroxy 29.4 (L) 30 - 100 ng/mL     DEXA BONE DENSITOMETRY, CENTRAL     INDICATION: Screening. 58-year-old male for baseline study. History of  parathyroid adenoma and right ankle fracture.     TECHNIQUE: Using GE LUNAR Prodigy densitometer, bone density measurement was  performed in the lumbar spine, the proximal left and right femora and the left  forearm. T Score refers to standard deviations above or below average compared  to a young adult of the same sex.   Z Score refers to standard deviations above  or below average compared to a patient of the same sex, age, race and weight.      COMPARISON: None available.      FINDINGS:      Lumbar Spine Levels: L1-L4  Mean Bone Mineral Density (BMD):  1.255 g/cm2    T Score: 0.2   Z Score: 0.7      Distal 1/3 Radius BMD:  1.052 g/cm2    T Score: 0.5       Z Score: 0.8        Left Total Proximal Femur BMD: 1.025 g/cm2    T Score:  -0.5    Z Score:  0.0       Right Total Proximal Femur BMD: 1.043 g/cm2   T Score:  -0.4       Z Score:  0.2         Left Femoral Neck BMD:  0.890 g/cm2    T Score: -1.4  Z Score: -0.4       Right Femoral Neck BMD:  0.915 g/cm2    T Score: -1.2  Z Score: -0.2      IMPRESSION  IMPRESSION:     BMD measures consistent with osteopenia.      Based upon current ISCD guidelines, the patient's overall diagnostic category,  selected using WHO criteria in postmenopausal women and males aged 48 and above,  is selected based upon the lowest T Score from among the lumbar spine, total  femur, femoral neck, (or distal third radius if measured).     WHO Definition of Osteoporosis and Osteopenia on DXA (specified for post  menopausal  females):       Normal:                     T Score at or above -1 SD    Osteopenia:              T Score between -1 and -2.5 SD    Osteoporosis:          T Score at or below -2.5 SD     The risk of fracture approximately doubles for each 1 SD decrease in T Score. It is important to consider other factors in assessing a patient's risk of  fracture, including age, risk of falling/injury, history of fragility fracture,  family history of osteoporosis, smoking, low weight.       Various fracture risk tools have been developed for adult patients and are  available online. For example, the FRAX tool developed by [facility] is widely used. Reference www.iscd.org.     It is also important to note that DXA measures bone density but does not  distinguish among causes of decreased bone density, which include primary versus  secondary osteoporosis (such as metabolic bone disorders or possible effects of  medications) and also other conditions (such as osteomalacia).   Clinical  considerations should determine what additional evaluation may be warranted to  exclude secondary conditions in a patient with low bone density.     Please note that reliable, valid comparisons can not be made between studies  which have been performed on different densitometers. If clinically warranted,  follow up study performed at this site would best permit assessment of trend for  possible change in bone mineral density over time in comparison to this study.     Thank you for this referral.  Mateo Gtz MD  Certified Clinical     ASSESSMENT and PLAN    ICD-10-CM ICD-9-CM    1. Elevated liver enzymes R74.8 790.5 REFERRAL TO GASTROENTEROLOGY   2. Osteopenia, unspecified location M85.80 733.90 REFERRAL TO RHEUMATOLOGY   3. Parathyroid adenoma, S/P resection 11/2013 D35.1 227.1 REFERRAL TO RHEUMATOLOGY   4. Primary hyperparathyroidism (HCC) E21.0 252.01 REFERRAL TO RHEUMATOLOGY   5. Acute idiopathic gout involving toe of left foot, ankle M10.072 274.01 colchicine 0.6 mg tablet      ibuprofen (MOTRIN) 800 mg tablet   6. Closed fracture of right ankle, sequela S82.891S 905.4 REFERRAL TO RHEUMATOLOGY   Visit based of time 45 minutes total,  with more than 50% of the face-to-face visit time spent in counseling on issues above, elev katelyn enzymes, lab and dexa review, it's treatment, prognosis, management advise, plan and follow-up recommendations. Follow-up Disposition:  Return in about 6 weeks (around 12/19/2017).

## 2017-11-07 NOTE — MR AVS SNAPSHOT
Visit Information Date & Time Provider Department Dept. Phone Encounter #  
 11/7/2017  2:30 PM [facility]abana [facility]aj[facility] [phone] Follow-up Instructions Return in about 6 weeks (around 12/19/2017). Upcoming Health Maintenance Date Due DTaP/Tdap/Td series (1 - Tdap) 5/19/1980 FOBT Q 1 YEAR AGE 50-75 10/30/2018 Allergies as of 11/7/2017  Review Complete On: 11/7/2017 By: Shabana Maharaj MD  
 No Known Allergies Current Immunizations  Never Reviewed Name Date Influenza Vaccine (Quad) PF 9/26/2017, 8/23/2016 Not reviewed this visit You Were Diagnosed With   
  
 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 4 Vitals BP Pulse Temp Resp Height(growth percentile) Weight(growth percentile) 131/79 (BP 1 Location: Right arm, BP Patient Position: Sitting) 64 97.3 °F (36.3 °C) (Oral) 20 5' 6\" (1.676 m) 166 lb 12 oz (75.6 kg) BMI Smoking Status 26.91 kg/m2 Never Smoker BMI and BSA Data Body Mass Index Body Surface Area  
 26.91 kg/m 2 1.88 m 2 Preferred Pharmacy Pharmacy Name Phone [phone] - [address], [address] AT [address] [phone] Your Updated Medication List  
  
   
This list is accurate as of: 11/7/17  3:46 PM.  Always use your most recent med list.  
  
  
  
  
 aspirin delayed-release 81 mg tablet Take  by mouth daily. colchicine 0.6 mg tablet Take 1 Tab by mouth three (3) times daily as needed.  mg capsule Generic drug:  docusate sodium TK 1 C PO BID  
  
 ibuprofen 800 mg tablet Commonly known as:  MOTRIN Take 1 Tab by mouth every eight (8) hours as needed for Pain. sertraline 50 mg tablet Commonly known as:  ZOLOFT  
TAKE 1 TABLET BY MOUTH DAILY Prescriptions Sent to Pharmacy Refills  
 colchicine 0.6 mg tablet 0 Sig: Take 1 Tab by mouth three (3) times daily as needed. Class: Normal  
 Pharmacy: [facility] [place] [id number] AT [address] [place] [address] [address] Ph #: [phone] Route: Oral  
 ibuprofen (MOTRIN) 800 mg tablet 0 Sig: Take 1 Tab by mouth every eight (8) hours as needed for Pain. Class: Normal  
 Pharmacy: [place] [id number] AT [address] [place] [address] [address] Ph #: [phone] Route: Oral  
  
We Performed the Following REFERRAL TO GASTROENTEROLOGY [CQW98 Custom] REFERRAL TO RHEUMATOLOGY [TIS73 Custom] Follow-up Instructions Return in about 6 weeks (around 12/19/2017). Referral Information Referral ID Referred By Referred To  
  
 [id number] Lauren Hernandez MD   
   . [place] [address] [address] Phone: [phone] Fax: [phone] Visits Status Start Date End Date 1 New Request 11/7/17 11/7/18 If your referral has a status of pending review or denied, additional information will be sent to support the outcome of this decision. Referral ID Referred By Referred To  
 [id number] Geronimo MEIDNA MD  
   [address] [place] [address] [address] [facility] [address] Phone: [phone] Fax: [phone] Visits Status Start Date End Date 1 New Request 11/7/17 11/7/18 If your referral has a status of pending review or denied, additional information will be sent to support the outcome of this decision. Patient Instructions Please contact our office if you have any questions about your visit today. Learning About Osteopenia What is osteopenia? Osteopenia is a decrease in thickness, or density, in bones. That means the bones become thinner and weaker. It is much more common in women than in men. It is an early form of osteoporosis, a condition in which the bones are so thin and weak that they can break easily. It's important to know that osteopenia is not a disease. It can happen normally with aging. Having osteopenia means that there is a greater risk that you may get osteoporosis. It also means that you are more likely to break a bone than someone who does not have osteopenia. But not everyone with osteopenia gets osteoporosis or breaks a bone. Osteopenia doesn't cause any symptoms. It's usually found with a type of X-ray called a bone density test. Osteopenia means that your bone density result (T-score) is between -1.0 and -2.5. What increases the risk for osteopenia? Things that increase your risk include: 
· Having a family history of osteoporosis. · Being thin. · Being white or . · Getting too little physical activity. · Smoking. · Drinking too much alcohol often. · Using certain medicines such as steroids. How can you prevent osteoporosis? There are things you can do to slow down osteopenia and prevent osteoporosis. Certain lifestyle changes will help slow the loss of bone density. · Eat food that has plenty of calcium and vitamin D. Yogurt, cheese, milk, and dark green vegetables are high in calcium. Eggs, fatty fish, cereal, and fortified milk are high in vitamin D. 
· Talk to your doctor about taking a calcium supplement that has vitamin D in it. · Get regular exercise. ¨ Do 30 minutes of weight-bearing exercise on most days of the week. Walking, jogging, stair climbing, and dancing are good choices. ¨ Do resistance exercises with weights or elastic bands 2 or 3 days a week. · Limit alcohol to 2 drinks a day for men and 1 drink a day for women. Too much alcohol can cause health problems. · Do not smoke. Smoking can make bones thin faster. If you need help quitting, talk to your doctor about stop-smoking programs and medicines. These can increase your chances of quitting for good. Prescription medicines are available for treating bone thinning. But these are more often used to treat osteoporosis. Follow-up care is a key part of your treatment and safety. Be sure to make and go to all appointments, and call your doctor if you are having problems. It's also a good idea to know your test results and keep a list of the medicines you take. Where can you learn more? Go to http://chelSimpareljaye.info/. Enter B282 in the search box to learn more about \"Learning About Osteopenia. \" Current as of: May 12, 2017 Content Version: 11.4 © 6927-9856 ChatLingual. Care instructions adapted under license by Open Air Publishing (which disclaims liability or warranty for this information). If you have questions about a medical condition or this instruction, always ask your healthcare professional. [place] [address] any warranty or liability for your use of this information. Preventing Osteoporosis: Care Instructions Your Care Instructions Osteoporosis means the bones are weak and thin enough that they can break easily. The older you are, the more likely you are to get osteoporosis. But with plenty of calcium, vitamin D, and exercise, you can help prevent osteoporosis. The preteen and teen years are a key time for bone building.  With the help of calcium, vitamin D, and exercise in those early years and beyond, the bones reach their peak density and strength by age 27. After age 27, your bones naturally start to thin and weaken. The stronger your bones are at around age 27, the lower your risk for osteoporosis. But no matter what your age and risk are, your bones still need calcium, vitamin D, and exercise to stay strong. Also avoid smoking, and limit alcohol. Smoking and heavy alcohol use can make your bones thinner. Talk to your doctor about any special risks you might have, such as having a close relative with osteoporosis or taking a medicine that can weaken bones. Your doctor can tell you the best ways to protect your bones from thinning. Follow-up care is a key part of your treatment and safety. Be sure to make and go to all appointments, and call your doctor if you are having problems. It's also a good idea to know your test results and keep a list of the medicines you take. How can you care for yourself at home? · Get enough calcium and vitamin D. The [place] of Medicine recommends adults younger than age 46 need 1,000 mg of calcium and 600 IU of vitamin D each day. Women ages 46 to 79 need 1,200 mg of calcium and 600 IU of vitamin D each day. Men ages 46 to 79 need 1,000 mg of calcium and 600 IU of vitamin D each day. Adults 71 and older need 1,200 mg of calcium and 800 IU of vitamin D each day. ¨ Eat foods rich in calcium, like yogurt, cheese, milk, and dark green vegetables. ¨ Eat foods rich in vitamin D, like eggs, fatty fish, cereal, and fortified milk. ¨ Get some sunshine. Your body uses sunshine to make its own vitamin D. The safest time to be out in the sun is before 10 a.m. or after 3 p.m. Avoid getting sunburned. Sunburn can increase your risk of skin cancer. ¨ Talk to your doctor about taking a calcium plus vitamin D supplement. Ask about what type of calcium is right for you, and how much to take at a time.  Adults ages 23 to 48 should not get more than 2,500 mg of calcium and 4,000 IU of vitamin D each day, whether it is from supplements and/or food. Adults ages 46 and older should not get more than 2,000 mg of calcium and 4,000 IU of vitamin D each day from supplements and/or food. · Get regular bone-building exercise. Weight-bearing and resistance exercises keep bones healthy by working the muscles and bones against gravity. Start out at an exercise level that feels right for you. Add a little at a time until you can do the following: ¨ Do 30 minutes of weight-bearing exercise on most days of the week. Walking, jogging, stair climbing, and dancing are good choices. ¨ Do resistance exercises with weights or elastic bands 2 to 3 days a week. · Limit alcohol. Drink no more than 1 alcohol drink a day if you are a woman. Drink no more than 2 alcohol drinks a day if you are a man. · Do not smoke. Smoking can make bones thin faster. If you need help quitting, talk to your doctor about stop-smoking programs and medicines. These can increase your chances of quitting for good. When should you call for help? Watch closely for changes in your health, and be sure to contact your doctor if you have any problems. Where can you learn more? Go to http://chel-jaye.info/. Enter M935 in the search box to learn more about \"Preventing Osteoporosis: Care Instructions. \" Current as of: May 12, 2017 Content Version: 11.4 © 3278-0250 Riffyn. Care instructions adapted under license by youbeQ - Maps With Life (which disclaims liability or warranty for this information). If you have questions about a medical condition or this instruction, always ask your healthcare professional. [place] [address] any warranty or liability for your use of this information. Liver Disease Diet: Care Instructions Your Care Instructions The liver does many jobs that are vital to the rest of your body.  When something is wrong with the liver, your body may not get the nutrition it needs. It is important that you eat a healthy diet that includes a variety of foods from the basic food groups: grains, vegetables, fruits, dairy, and protein foods. Follow your doctor's instructions for eating carbohydrate, protein, and fat in the right amounts for you. Your doctor also may limit salt or take salt out of your diet to help protect the liver. Always talk with your doctor or dietitian before you make changes in your diet. Follow-up care is a key part of your treatment and safety. Be sure to make and go to all appointments, and call your doctor if you are having problems. It's also a good idea to know your test results and keep a list of the medicines you take. How can you care for yourself at home? · Work with your doctor or dietitian to create a food plan that guides your daily food choices. · Eat a balanced diet. Do not skip meals or go for many hours without eating. If you eat several small meals during the day, you have a better chance of getting the extra calories your body needs for energy. · Your doctor may recommend a high-carbohydrate diet to get enough calories, since you may have to limit fat. You may need to spread carbohydrate throughout your meals and snacks to control the amount of sugar in your blood. Eat six small meals a day, rather than three big ones. Carbohydrate is found in: ¨ Whole-grain and refined breads and cereals. ¨ Some vegetables. ¨ Cooked beans (such as kidney or black beans) and peas (such as lentils or split peas). ¨ Fruits. ¨ Low-fat or nonfat milk and milk products, which also supply protein. ¨ Candy, table sugar, and sugary soda drinks (try to limit these). · Follow your doctor's or dietitian's instructions on how to get the right amount of protein in your diet. Examples of animal protein are: ¨ Meat, fish, and poultry. ¨ Eggs. ¨ Milk and milk products. · Your doctor or dietitian may ask you to eat a certain amount of protein that comes from plants (rather than protein that comes from animals). You can get plant protein from foods such as: 
¨ Cooked dried beans and peas. ¨ Peanut butter, nuts, and seeds. ¨ Tofu. · Limit salt, if your doctor tells you to. This will help prevent fluid buildup in your belly and chest, which can cause serious problems. Salt is in many prepared foods, such as childers, canned foods, snack foods, sauces, and soups. Look for reduced-salt products. · Your doctor may recommend vitamin and mineral supplements. However, do not take any other medicine, including over-the-counter medicines, vitamins, and herbal products, without talking to your doctor first. 
· Do NOT take acetaminophen (Tylenol), ibuprofen (Advil, Motrin), or naproxen (Aleve). These can cause more liver damage. · Do not drink any alcohol. It can harm your liver. Talk to your doctor if you need help to stop drinking. · If you have a loss of appetite or have nausea or vomiting, try to: 
¨ Stay away from foods and food smells that make you feel worse. ¨ Avoid greasy or fatty foods. ¨ Eat food that settles your stomach when it feels upset. Try crackers, dry toast, or wendy (wendy tea, hard wendy candy, or crystallized wendy). When should you call for help? Watch closely for changes in your health, and be sure to contact your doctor if you have any problems. Where can you learn more? Go to http://chel-jaye.info/. Enter G698 in the search box to learn more about \"Liver Disease Diet: Care Instructions. \" Current as of: May 12, 2017 Content Version: 11.4 © 3861-2640 SkyBridge. Care instructions adapted under license by Choosly (which disclaims liability or warranty for this information).  If you have questions about a medical condition or this instruction, always ask your healthcare professional. [employer] disclaims any warranty or liability for your use of this information. Introducing [facility]! Dear Angie Corral: Thank you for requesting a Level Four Software account. Our records indicate that you already have an active Level [employer] Software account. You can access your account anytime at [email or web address]. [employer] Spine/Unkasoft Advergaming Did you know that you can access your hospital and ER discharge instructions at any time in Level Four Software? You can also review all of your test results from your hospital stay or ER visit. Additional Information If you have questions, please visit the Frequently Asked Questions section of the Level Four Software website at [email or web address] [email or web address]/. Remember, Level Four Software is NOT to be used for urgent needs. For medical emergencies, dial 911. Now available from your iPhone and Android! Please provide this summary of care documentation to your next provider. Your primary care clinician is listed as REUBEN MEDINA. If you have any questions after today's visit, please call [phone].

## 2017-12-14 ENCOUNTER — TELEPHONE (OUTPATIENT)
Dept: FAMILY MEDICINE CLINIC | Age: 57
End: 2017-12-14

## 2017-12-14 NOTE — TELEPHONE ENCOUNTER
Pt has an appointment on 12/19/17 with Dr. Shady Bateman and states Dr Shady Bateman was suppose to order labs for him to have prior to his appointment to recheck his liver enzymes. No future labs have been ordered. Pt wants to know if he should cancel his appointment until he can get labs drawn. I explained to the patient that Dr. Shady Bateman is out of the office and we would have to wait until she returns to check with her.

## 2017-12-15 NOTE — TELEPHONE ENCOUNTER
Dr. Ryan Comer did not order labs, ordered referral to Gastroenterology. Luzmaria Jha, PSR, will check on referral and call patient.

## 2018-01-08 ENCOUNTER — HOSPITAL ENCOUNTER (OUTPATIENT)
Dept: ULTRASOUND IMAGING | Age: 58
Discharge: HOME | End: 2018-01-08
Attending: INTERNAL MEDICINE
Payer: COMMERCIAL

## 2018-01-08 DIAGNOSIS — R94.5 ABNORMAL LIVER FUNCTION: ICD-10-CM

## 2018-01-08 PROCEDURE — 76705 ECHO EXAM OF ABDOMEN: CPT

## 2019-01-24 ENCOUNTER — HOSPITAL ENCOUNTER (OUTPATIENT)
Dept: ULTRASOUND IMAGING | Age: 59
Discharge: HOME | End: 2019-01-24
Attending: NURSE PRACTITIONER
Payer: COMMERCIAL

## 2019-01-24 DIAGNOSIS — R74.8 ABNORMAL LIVER ENZYMES: ICD-10-CM

## 2019-01-24 DIAGNOSIS — K76.0 STEATOSIS OF LIVER: ICD-10-CM

## 2019-01-24 PROCEDURE — 76705 ECHO EXAM OF ABDOMEN: CPT

## 2020-10-16 ENCOUNTER — HOSPITAL ENCOUNTER (OUTPATIENT)
Dept: ULTRASOUND IMAGING | Age: 60
Discharge: HOME | End: 2020-10-16
Attending: NURSE PRACTITIONER
Payer: COMMERCIAL

## 2020-10-16 DIAGNOSIS — R74.02 NONSPECIFIC ELEVATION OF LEVELS OF TRANSAMINASE OR LACTIC ACID DEHYDROGENASE (LDH): ICD-10-CM

## 2020-10-16 DIAGNOSIS — K76.0 FATTY METAMORPHOSIS OF LIVER: ICD-10-CM

## 2020-10-16 DIAGNOSIS — R74.8 ACID PHOSPHATASE ELEVATED: ICD-10-CM

## 2020-10-16 DIAGNOSIS — Z12.11 SPECIAL SCREENING FOR MALIGNANT NEOPLASMS, COLON: ICD-10-CM

## 2020-10-16 DIAGNOSIS — R94.5 ABNORMAL LIVER FUNCTION: ICD-10-CM

## 2020-10-16 DIAGNOSIS — R74.01 NONSPECIFIC ELEVATION OF LEVELS OF TRANSAMINASE OR LACTIC ACID DEHYDROGENASE (LDH): ICD-10-CM

## 2020-10-16 PROCEDURE — 76705 ECHO EXAM OF ABDOMEN: CPT

## 2022-10-19 ENCOUNTER — TELEPHONE (OUTPATIENT)
Dept: PHYSICAL THERAPY | Age: 62
End: 2022-10-19

## 2022-10-31 ENCOUNTER — HOSPITAL ENCOUNTER (OUTPATIENT)
Dept: PHYSICAL THERAPY | Age: 62
Discharge: HOME | End: 2022-10-31
Payer: COMMERCIAL

## 2022-10-31 PROCEDURE — 97161 PT EVAL LOW COMPLEX 20 MIN: CPT

## 2022-10-31 PROCEDURE — 97110 THERAPEUTIC EXERCISES: CPT

## 2022-10-31 NOTE — PROGRESS NOTES
[facility]  [address]  [place], [address]  [phone] fax    Plan of Care/ Statement of Necessity for Physical Therapy Services    Patient name: Litzy Vogt. Start of Care: 10/31/2022   Referral source: Prakash Frederick MD : 1960    Medical Diagnosis: Neck pain [M54.2]  Pain in left shoulder [M25.512]  Pain in right shoulder [M25.511]  Payor: BLUE CROSS / Plan:  [facility] [place] / Product Type: PPO /  Onset Date: 2022    Treatment Diagnosis: Neck and (B) shoulder pain   Prior Hospitalization: see medical history Provider#: [id number]   Medications: Verified on Patient summary List    Comorbidities: no co-morbidities reported    Prior Level of Function: Patient participates in yoga 2x/week and is active with doing yardwork and managing household. Patient is right hand dominant. The Plan of Care and following information is based on the information from the initial evaluation. Assessment/ key information: Patient presents with cervical and (B) shoulder pain that began on 2022 when patient was throwing the football for awhile and noticed cervical and (B) shoulder pain that developed later that night. Patient doesn't recall a specific injury. Patient reported (B) shoulder pain originally began approximately 11 years ago after lifting weights. Patient describes cervical pain as intermittent tightness that is predominately along (B) cervical paraspinals and occasionally will experience a sharp pain. Patient has increased difficulty with jogging, turning and tilting head, and sleeping on side irritates neck and shoulder (patient denies waking due to pain). Patient denies numbness/tingling, and no headaches.   Patient exhibits decreased cervical AROM, decreased (B) shoulder strength/AROM, decreased scapular strength, thoracic hypomobility, postural dysfunction, and increase musculature tightness noted at (B) UT/levator scap and along thoracic paraspinals. Negative Neers, bello-estelle, and cross over test bilaterally. Patient demonstrates potential to make functional gains within a reasonable time frame. Patient would benefit from skilled PT to address above deficits and assist with return to PLOF. Evaluation Complexity History LOW Complexity : Zero comorbidities / personal factors that will impact the outcome / POC; Examination LOW Complexity : 1-2 Standardized tests and measures addressing body structure, function, activity limitation and / or participation in recreation  ;Presentation LOW Complexity : Stable, uncomplicated  ;Clinical Decision Making MEDIUM Complexity : FOTO score of 26-74  Overall Complexity Rating: LOW   Problem List: pain affecting function, decrease ROM, decrease strength, decrease ADL/ functional abilitiies, decrease activity tolerance, decrease flexibility/ joint mobility, and decrease transfer abilities   Treatment Plan may include any combination of the following: Therapeutic exercise, Neuromuscular reeducation, Manual therapy, Therapeutic activity, Self care/home management, and Electric stim unattended   Patient / Family readiness to learn indicated by: asking questions, trying to perform skills, and interest  Persons(s) to be included in education: patient (P)  Barriers to Learning/Limitations: None  Patient Goal (s): \"manage pain\"  Patient Self Reported Health Status: good  Rehabilitation Potential: good    Short Term Goals: To be accomplished in 2 weeks:   1. Patient will be independent and compliant with HEP 1-2x/day to increase ease with ADls. Eval; HEP established   2. Patient will improve (B) cervical AROM rotation to (B) 65deg to increase ease with driving. Eval; Cervical AROM rotation: right:62deg, left: 32deg  Long Term Goals: To be accomplished in 4 weeks:   1. Patient will improve FOTO to at least 73 to demonstrate improved function. Eval; FOTO: 70   2.  Patient will improve (B) shoulder AROM flexion and scaption to 150deg to increase ease with overhead activities. Eval:  shoulder AROM flexion: (B) 141deg scaption: (B) 141deg    3. Patient will improve cervical lateral flexion AROM to 20deg to assist with return to PLOF. Eval; Cervical AROM lateral flexion: right: 16deg left: 12deg   Frequency / Duration: Patient to be seen 2 times per week for 4 weeks. Patient/ Caregiver education and instruction: Diagnosis, prognosis, exercises   [x]  Plan of care has been reviewed with CARLOS Copeland, PT 10/31/2022 1:51 PM    ________________________________________________________________________    I certify that the above Therapy Services are being furnished while the patient is under my care. I agree with the treatment plan and certify that this therapy is necessary.     [de-identified] Signature:____________Date:_________TIME:________     Alie Myrick MD  ** Signature, Date and Time must be completed for valid certification **    Please sign and return to [facility] [address], [address]  [phone] fax

## 2022-10-31 NOTE — PROGRESS NOTES
PT DAILY TREATMENT NOTE/CERVICAL HNES37-36    Patient Name: Eulalia Tracy. Date:10/31/2022  : 1960  [x]  Patient  Verified  Payor: BLUE CROSS / Plan: [employer] [facility] / Product Type: PPO /    In time: 9:52  Out time:10:44  Total Treatment Time (min): 52  Visit #: 1 of 8    Medicare/BCBS Only   Total Timed Codes (min):  23 1:1 Treatment Time:  52     Treatment Area: Neck pain [M54.2]  Pain in left shoulder [M25.512]  Pain in right shoulder [M25.511]    SUBJECTIVE  Pain Level (0-10 scale): 0.5/10-cervical, 0/10- (B) shoulders   []constant [x]intermittent []improving []worsening []no change since onset    Any medication changes, allergies to medications, adverse drug reactions, diagnosis change, or new procedure performed?: [x] No    [] Yes (see summary sheet for update)  Subjective functional status/changes:     PLOF: Patient participates in yoga 2x/week and is active with doing yardwork and managing household. Patient is right hand dominant. Limitations to PLOF: Patient exhibits decreased cervical AROM, decreased (B) shoulder strength/AROM, decreased scapular strength, thoracic hypomobility, postural dysfunction, and increase musculature tightness noted at (B) UT/levator scap and along thoracic paraspinals. Negative Neers, bello-estelle, and cross over test bilaterally. Patient demonstrates potential to make functional gains within a reasonable time frame. Patient would benefit from skilled PT to address above deficits and assist with return to PLOF. Mechanism of Injury: Patient presents with cervical and (B) shoulder pain that began on 2022 when patient was throwing the football for awhile and noticed cervical and (B) shoulder pain that developed later that night. Patient doesn't recall a specific injury, but developed after increased activity. Patient reported (B) shoulder pain originally began approximately 11 years ago after lifting weights.    Current symptoms/Complaints: Patient describes cervical pain as intermittent tightness that is predominately along (B) cervical paraspinals and occasionally will experience a sharp pain. Patient has increased difficulty with jogging, turning and tilting head, and sleeping on side irritates neck and shoulder (patient denies waking due to pain). Patient denies numbness/tingling, no headaches. Previous Treatment/Compliance: cortisone shot 2-3 weeks ago in (B) shoulders and patient stated that pain is better. Prior PT for feet. PMHx/Surgical Hx: No previous neck/shoulder/UE/back surg. NO pacemaker. No cardiac issues, and no cancer. Work Hx: retired   Pt Goals: \"manage pain\"  Cognition: A & O x 2    Other:    OBJECTIVE/EXAMINATION    29 min []Eval                  []Re-Eval       23 min Therapeutic Exercise:  [x] See flow sheet : seated: Scap retact-10x,  chin tuck-5x, sidelying: open book and shld ER-5x each, supine: chin tuck-5x, cervical AROM rotation-5x, prone: scap retract-5x    Rationale: increase ROM and increase strength to improve the patients ability to perform ADls safely. With   [x] TE   [] TA   [] neuro   [] other: Patient Education: [x] Review HEP    [] Progressed/Changed HEP based on:   [] positioning   [] body mechanics   [] transfers   [] heat/ice application    [] other:  educated patient on importance of posture for mechanics of shoulder/scapular joint and alignment of cervical spine. Also discussed ergonomics of work station to promote improved posture. Other Objective/Functional Measures:     Physical Therapy Evaluation Cervical Spine     Past History/Treatments:    Diagnostic Tests: [] Lab work [] X-rays    [] CT [] MRI     [] Other:  Results: in MD noted in chart. Headaches: Do you have headaches? [] Yes   [x] No  How often do you get headaches? How long does the headache last?  What aggravates it? What relieves it?   Does the headache coincide with any other symptoms (visual disturbances, light sensitivity)? Where is the headache? Does it change locations? Other:    OBJECTIVE  Posture: [] WNL  Head Position: mild fwd head   Shoulder/Scapular Position:  mild rounded shoulders. T-Kyphosis:  WFL  Cervical Retraction: [] WNL    [x] Abnormal: limited     Shoulder/Scapular Screen: [] WNL    [] Abnormal:   shoulder AROM flexion: (B) 141deg scaption: (B) 141deg   Shoulder strength: Flexion: (B) 4/5 Scaption: (B) 4/5  Active Movements: [] N/A   [] Too acute   [] Other:  ROM % AROM % PROM Comments:pain, area   Forward flexion 46  No pain    Extension 32  No pain into extension, but slight discomfort with returning ot neutral from an extended position. SB right 16   No pain reported but tight in all planes. SB left  12     Rotation right 62     Rotation left 32       Thoracic Spine: [] N/A    [] WNL   [] Other: tightness noted. Palpation:   Location: tightness located at (B) UT/levator scap and thoracic paraspinals       Muscle Flexibility: [] N/A   Scalenes: [] WNL    [x] Tight    [x] R    [x] L   Upper Trap: [] WNL    [x] Tight    [x] R    [x] L   Levator: [] WNL    [x] Tight    [x] R    [x] L   Pect. Minor: [] WNL    [x] Tight    [x] R    [x] L    Global Muscular Weakness: [] N/A   Lower Trap: (B) 3-/5    Rhomboids: 3/5    Middle Trap: 4-/5      Other tests/comments:       Pain Level (0-10 scale) post treatment: 0/10     ASSESSMENT/Changes in Function: See POC. Advised patient to perform HEP gently and to stop if pain increases. Patient will continue to benefit from skilled PT services to modify and progress therapeutic interventions, address functional mobility deficits, address ROM deficits, address strength deficits, analyze and address soft tissue restrictions, analyze and cue movement patterns, analyze and modify body mechanics/ergonomics, assess and modify postural abnormalities, and address imbalance/dizziness to attain remaining goals.      [x]  See Plan of Care  []  See progress note/recertification  []  See Discharge Summary         Progress towards goals / Updated goals:  See POC     PLAN  []  Upgrade activities as tolerated     [x]  Continue plan of care  []  Update interventions per flow sheet       []  Discharge due to:_  []  Other:_      Jensen Harley, PT 10/31/2022  9:56 AM

## 2022-11-08 ENCOUNTER — HOSPITAL ENCOUNTER (OUTPATIENT)
Dept: PHYSICAL THERAPY | Age: 62
Discharge: HOME | End: 2022-11-08
Payer: COMMERCIAL

## 2022-11-08 PROCEDURE — 97110 THERAPEUTIC EXERCISES: CPT

## 2022-11-08 PROCEDURE — 97112 NEUROMUSCULAR REEDUCATION: CPT

## 2022-11-08 NOTE — PROGRESS NOTES
PT DAILY TREATMENT NOTE     Patient Name: Celsa Lowery. Date:2022  : 1960  [x]  Patient  Verified  Payor: BLUE CROSS / Plan: [employer] [facility] [employer] / Product Type: PPO /    In time:1:00  Out time:1:39  Total Treatment Time (min): 39  Visit #: 2 of 8    Medicare/BCBS Only   Total Timed Codes (min):  39 1:1 Treatment Time:  39       Treatment Area: Neck pain [M54.2]  Pain in left shoulder [M25.512]  Pain in right shoulder [M25.511]    SUBJECTIVE  Pain Level (0-10 scale): 0.5  Any medication changes, allergies to medications, adverse drug reactions, diagnosis change, or new procedure performed?: [x] No    [] Yes (see summary sheet for update)  Subjective functional status/changes:   [] No changes reported  \"It barely hurts at all\"    OBJECTIVE    29 min Therapeutic Exercise:  [x] See flow sheet :   Rationale: increase ROM and increase strength to improve the patients ability to perform ADLs    10 min Neuromuscular Re-education:  [x]  See flow sheet :   Rationale: increase strength, improve coordination, and increase proprioception  to improve the patients ability to perform functional tasks            With   [] TE   [] TA   [] neuro   [] other: Patient Education: [x] Review HEP    [] Progressed/Changed HEP based on:   [] positioning   [] body mechanics   [] transfers   [] heat/ice application    [] other:      Other Objective/Functional Measures:   First visit after Eval     Pain Level (0-10 scale) post treatment: 0.5    ASSESSMENT/Changes in Function: Initiated treatment program per flow sheet. Postural cues throughout treatment. Min incr'd shoulder irritation with tband ER. Good tolerance to therex. Patient states he is active and does yoga and wants to learn exercises to avoid neck and shoulder pain. 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 Pt denies incr'd pain following treatment. Patient will continue to benefit from skilled PT services to modify and progress therapeutic interventions, address functional mobility deficits, address ROM deficits, address strength deficits, analyze and address soft tissue restrictions, analyze and cue movement patterns, analyze and modify body mechanics/ergonomics, and assess and modify postural abnormalities to attain remaining goals. []  See Plan of Care  []  See progress note/recertification  []  See Discharge Summary         Progress towards goals / Updated goals:  Short Term Goals: To be accomplished in 2 weeks:               1. Patient will be independent and compliant with HEP 1-2x/day to increase ease with ADls. Eval; HEP established   Current:Not yet attempted 11/8/2022               2. Patient will improve (B) cervical AROM rotation to (B) 65deg to increase ease with driving. Eval; Cervical AROM rotation: right:62deg, left: 32deg  Long Term Goals: To be accomplished in 4 weeks:               1. Patient will improve FOTO to at least 73 to demonstrate improved function. Eval; FOTO: 70               2. Patient will improve (B) shoulder AROM flexion and scaption to 150deg to increase ease with overhead activities. Eval:  shoulder AROM flexion: (B) 141deg scaption: (B) 141deg                3. Patient will improve cervical lateral flexion AROM to 20deg to assist with return to PLOF.                Eval; Cervical AROM lateral flexion: right: 16deg left: 12deg     PLAN  []  Upgrade activities as tolerated     [x]  Continue plan of care  []  Update interventions per flow sheet       []  Discharge due to:_  []  Other:_      Ledy Hayes PTA 11/8/2022  1:00 PM    Future Appointments   Date Time Provider Stephanie Garcia   11/11/2022 12:00 PM Michel Campbell, [facility]   11/15/2022  9:15 AM Yolande Cast PTA [facility]   11/17/2022 10:45 AM Keo Stanford PTA [facility]   11/22/2022 9:00 [facility]   11/29/2022  9:00 AM Duane Medina, PT [facility]   12/1/2022  9:00 AM [facility]ren[facility]

## 2022-11-11 ENCOUNTER — HOSPITAL ENCOUNTER (OUTPATIENT)
Dept: PHYSICAL THERAPY | Age: 62
Discharge: HOME | End: 2022-11-11
Payer: COMMERCIAL

## 2022-11-11 PROCEDURE — 97110 THERAPEUTIC EXERCISES: CPT

## 2022-11-11 PROCEDURE — 97112 NEUROMUSCULAR REEDUCATION: CPT

## 2022-11-11 NOTE — PROGRESS NOTES
PT DAILY TREATMENT NOTE 10-18    Patient Name: Chayo Michaud. Date:2022  : 1960  [x]  Patient  Verified  Payor: BLUE CROSS / Plan:  [facility] / Product Type: PPO /    In time:1200  Out time:1249  Total Treatment Time (min): 49  Visit #: 3 of 8    Medicare/BCBS Only   Total Timed Codes (min):  49 1:1 Treatment Time:  49       Treatment Area: Neck pain [M54.2]  Pain in left shoulder [M25.512]  Pain in right shoulder [M25.511]    SUBJECTIVE  Pain Level (0-10 scale): . 5  Any medication changes, allergies to medications, adverse drug reactions, diagnosis change, or new procedure performed?: [x] No    [] Yes (see summary sheet for update)  Subjective functional status/changes:   [] No changes reported  I'm feeling pretty good. OBJECTIVE    39 min Therapeutic Exercise:  [] See flow sheet :   Rationale: increase ROM and increase strength to improve the patients ability to perform daily activities      10 min Neuromuscular Re-education:  []  See flow sheet :   Rationale: increase ROM and increase strength  to improve the patients ability to perform daily activities     With   [] TE   [] TA   [] neuro   [] other: Patient Education: [x] Review HEP    [] Progressed/Changed HEP based on:   [] positioning   [] body mechanics   [] transfers   [] heat/ice application    [] other:      Other Objective/Functional Measures:      Pain Level (0-10 scale) post treatment: 0    ASSESSMENT/Changes in Function: Added therex per flow sheet; tolerated well without increase in pain. Minimal cueing for form; is ready to progress and add resistance to sidelying shoulder exercises. Patient will continue to benefit from skilled PT services to modify and progress therapeutic interventions, address functional mobility deficits, address ROM deficits, address strength deficits, analyze and address soft tissue restrictions, and analyze and cue movement patterns to attain remaining goals.      []  See Plan of Care  []  See progress note/recertification  []  See Discharge Summary         Progress towards goals / Updated goals:  Short Term Goals: To be accomplished in 2 weeks:               1. Patient will be independent and compliant with HEP 1-2x/day to increase ease with ADls. Eval; HEP established              Current:Not yet attempted 11/8/2022               2. Patient will improve (B) cervical AROM rotation to (B) 65deg to increase ease with driving. Eval; Cervical AROM rotation: right:62deg, left: 32deg  Long Term Goals: To be accomplished in 4 weeks:               1. Patient will improve FOTO to at least 73 to demonstrate improved function. Eval; FOTO: 70               2. Patient will improve (B) shoulder AROM flexion and scaption to 150deg to increase ease with overhead activities. Eval:  shoulder AROM flexion: (B) 141deg scaption: (B) 141deg                3. Patient will improve cervical lateral flexion AROM to 20deg to assist with return to PLOF.                Eval; Cervical AROM lateral flexion: right: 16deg left: 12deg     PLAN  [x]  Upgrade activities as tolerated     []  Continue plan of care  []  Update interventions per flow sheet       []  Discharge due to:_  []  Other:_      HENRRY Anderson, CMTPT 11/11/2022  8:53 AM    Future Appointments   Date Time Provider Stephanie Garcia   11/11/2022 12:00 PM Reza Levels, PT [facility]   11/15/2022  9:15 AM Sarah Shepherd, PTA [facility]   11/17/2022 10:45 AM Colette Sanchez, PTA [facility]   11/22/2022  9:00 AM Julian Hernandez, PT [facility]   11/29/2022  9:00 AM Melanai Baig, PT [facility]   12/1/2022  9:00 AM Reza Levels, PT [facility]

## 2022-11-15 ENCOUNTER — APPOINTMENT (OUTPATIENT)
Dept: PHYSICAL THERAPY | Age: 62
End: 2022-11-15
Payer: COMMERCIAL

## 2022-11-17 ENCOUNTER — APPOINTMENT (OUTPATIENT)
Dept: PHYSICAL THERAPY | Age: 62
End: 2022-11-17
Payer: COMMERCIAL

## 2022-11-22 ENCOUNTER — APPOINTMENT (OUTPATIENT)
Dept: PHYSICAL THERAPY | Age: 62
End: 2022-11-22
Payer: COMMERCIAL

## 2022-11-29 ENCOUNTER — APPOINTMENT (OUTPATIENT)
Dept: PHYSICAL THERAPY | Age: 62
End: 2022-11-29
Payer: COMMERCIAL

## 2022-12-01 ENCOUNTER — APPOINTMENT (OUTPATIENT)
Dept: PHYSICAL THERAPY | Age: 62
End: 2022-12-01

## 2023-01-05 NOTE — PROGRESS NOTES
[facility]  [address]  [place], [address]  [phone] fax    Physical Therapy Discharge Summary  Patient name: Ryne Duncan. Start of Care: 10/31/2022   Referral source: Jeannette Alvarado MD : 1960                Medical Diagnosis: Neck pain [M54.2]  Pain in left shoulder [M25.512]  Pain in right shoulder [M25.511]  Payor: BLUE CROSS / Plan: [facility] / Product Type: PPO /  Onset Date: 2022                Treatment Diagnosis: Neck and (B) shoulder pain   Prior Hospitalization: see medical history Provider#: [id number]   Medications: Verified on Patient summary List    Comorbidities: no co-morbidities reported    Prior Level of Function: Patient participates in yoga 2x/week and is active with doing yardwork and managing household. Patient is right hand dominant. Visits from Start of Care: 3    Missed Visits: 0  Reporting Period : 10/31/2022 to 2022    Summary of Care:  Goal: Patient will be independent and compliant with HEP 1-2x/day to increase ease with ADls. Status at last note/certification: unable to reassess  Status at discharge: not met    Goal: Patient will improve (B) cervical AROM rotation to (B) 65deg to increase ease with driving. Status at last note/certification: unable to reassess  Status at discharge: not met    Goal: Patient will improve FOTO to at least 73 to demonstrate improved function. Status at last note/certification: unable to reassess  Status at discharge: not met    Goal:. Patient will improve (B) shoulder AROM flexion and scaption to 150deg to increase ease with overhead activities. Status at last note/certification: unable to reassess  Status at discharge: not met    Goal: Patient will improve cervical lateral flexion AROM to 20deg to assist with return to PLOF.   Status at last note/certification: unable to reassess  Status at discharge: not met    ASSESSMENT/RECOMMENDATIONS:  Pt was seen for 3 therapy sessions. The pt's last therapy appointment was on 11/11/2022. Per pt's chart, the pt requested d/c secondary to going out of town. Pt is therefore d/c'ed from therapy and unable to reassess goals above.     [x]Discontinue therapy: []Patient has reached or is progressing toward set goals      [x]Patient has abdicated      []Due to lack of appreciable progress towards set goals    Kaitlin Temple, PT 1/5/2023 3:20 PM

## 2024-05-14 ENCOUNTER — TELEPHONE (OUTPATIENT)
Facility: HOSPITAL | Age: 64
End: 2024-05-14

## 2024-05-15 ENCOUNTER — HOSPITAL ENCOUNTER (OUTPATIENT)
Facility: HOSPITAL | Age: 64
Discharge: HOME | End: 2024-05-15
Attending: RADIOLOGY | Admitting: RADIOLOGY
Payer: COMMERCIAL

## 2024-05-15 VITALS
TEMPERATURE: 97.6 F | RESPIRATION RATE: 14 BRPM | DIASTOLIC BLOOD PRESSURE: 70 MMHG | HEART RATE: 49 BPM | OXYGEN SATURATION: 96 % | SYSTOLIC BLOOD PRESSURE: 108 MMHG

## 2024-05-15 DIAGNOSIS — K76.0 STEATOSIS OF LIVER: ICD-10-CM

## 2024-05-15 DIAGNOSIS — Z80.0 FAMILY HISTORY OF CANCER OF EXTRAHEPATIC BILE DUCTS: ICD-10-CM

## 2024-05-15 DIAGNOSIS — R74.8 ABNORMAL LIVER ENZYMES: ICD-10-CM

## 2024-05-15 DIAGNOSIS — R74.8 ALKALINE PHOSPHATASE RAISED: ICD-10-CM

## 2024-05-15 LAB
ANION GAP SERPL CALC-SCNC: 4 MMOL/L (ref 3–18)
APTT PPP: 28.4 SEC (ref 23–36.4)
BASOPHILS # BLD: 0 K/UL (ref 0–0.1)
BASOPHILS NFR BLD: 1 % (ref 0–2)
BUN SERPL-MCNC: 11 MG/DL (ref 7–18)
BUN/CREAT SERPL: 12 (ref 12–20)
CALCIUM SERPL-MCNC: 8.3 MG/DL (ref 8.5–10.1)
CHLORIDE SERPL-SCNC: 107 MMOL/L (ref 100–111)
CO2 SERPL-SCNC: 26 MMOL/L (ref 21–32)
CREAT SERPL-MCNC: 0.89 MG/DL (ref 0.6–1.3)
DIFFERENTIAL METHOD BLD: ABNORMAL
EOSINOPHIL # BLD: 0.3 K/UL (ref 0–0.4)
EOSINOPHIL NFR BLD: 4 % (ref 0–5)
ERYTHROCYTE [DISTWIDTH] IN BLOOD BY AUTOMATED COUNT: 12.9 % (ref 11.6–14.5)
GLUCOSE SERPL-MCNC: 107 MG/DL (ref 74–99)
HCT VFR BLD AUTO: 41 % (ref 36–48)
HGB BLD-MCNC: 14 G/DL (ref 13–16)
IMM GRANULOCYTES # BLD AUTO: 0 K/UL (ref 0–0.04)
IMM GRANULOCYTES NFR BLD AUTO: 0 % (ref 0–0.5)
INR PPP: 1 (ref 0.9–1.1)
LYMPHOCYTES # BLD: 1.5 K/UL (ref 0.9–3.6)
LYMPHOCYTES NFR BLD: 25 % (ref 21–52)
MCH RBC QN AUTO: 31.6 PG (ref 24–34)
MCHC RBC AUTO-ENTMCNC: 34.1 G/DL (ref 31–37)
MCV RBC AUTO: 92.6 FL (ref 78–100)
MONOCYTES # BLD: 0.6 K/UL (ref 0.05–1.2)
MONOCYTES NFR BLD: 10 % (ref 3–10)
NEUTS SEG # BLD: 3.7 K/UL (ref 1.8–8)
NEUTS SEG NFR BLD: 60 % (ref 40–73)
NRBC # BLD: 0 K/UL (ref 0–0.01)
NRBC BLD-RTO: 0 PER 100 WBC
PLATELET # BLD AUTO: 243 K/UL (ref 135–420)
PMV BLD AUTO: 9.1 FL (ref 9.2–11.8)
POTASSIUM SERPL-SCNC: 4 MMOL/L (ref 3.5–5.5)
PROTHROMBIN TIME: 12.9 SEC (ref 11.9–14.7)
RBC # BLD AUTO: 4.43 M/UL (ref 4.35–5.65)
SODIUM SERPL-SCNC: 137 MMOL/L (ref 136–145)
WBC # BLD AUTO: 6.2 K/UL (ref 4.6–13.2)

## 2024-05-15 PROCEDURE — 2500000003 HC RX 250 WO HCPCS: Performed by: RADIOLOGY

## 2024-05-15 PROCEDURE — 85025 COMPLETE CBC W/AUTO DIFF WBC: CPT

## 2024-05-15 PROCEDURE — 85730 THROMBOPLASTIN TIME PARTIAL: CPT

## 2024-05-15 PROCEDURE — 85610 PROTHROMBIN TIME: CPT

## 2024-05-15 PROCEDURE — 2580000003 HC RX 258: Performed by: RADIOLOGY

## 2024-05-15 PROCEDURE — 6360000002 HC RX W HCPCS: Performed by: RADIOLOGY

## 2024-05-15 PROCEDURE — 88307 TISSUE EXAM BY PATHOLOGIST: CPT

## 2024-05-15 PROCEDURE — 80048 BASIC METABOLIC PNL TOTAL CA: CPT

## 2024-05-15 PROCEDURE — 88313 SPECIAL STAINS GROUP 2: CPT

## 2024-05-15 PROCEDURE — 47000 NEEDLE BIOPSY OF LIVER PERQ: CPT

## 2024-05-15 RX ORDER — SODIUM CHLORIDE 9 MG/ML
INJECTION, SOLUTION INTRAVENOUS CONTINUOUS
Status: DISCONTINUED | OUTPATIENT
Start: 2024-05-15 | End: 2024-05-15 | Stop reason: HOSPADM

## 2024-05-15 RX ORDER — FENTANYL CITRATE 50 UG/ML
INJECTION, SOLUTION INTRAMUSCULAR; INTRAVENOUS PRN
Status: COMPLETED | OUTPATIENT
Start: 2024-05-15 | End: 2024-05-15

## 2024-05-15 RX ORDER — FENTANYL CITRATE 50 UG/ML
INJECTION, SOLUTION INTRAMUSCULAR; INTRAVENOUS
Status: DISCONTINUED
Start: 2024-05-15 | End: 2024-05-15 | Stop reason: HOSPADM

## 2024-05-15 RX ORDER — MIDAZOLAM HYDROCHLORIDE 1 MG/ML
INJECTION INTRAMUSCULAR; INTRAVENOUS
Status: DISCONTINUED
Start: 2024-05-15 | End: 2024-05-15 | Stop reason: HOSPADM

## 2024-05-15 RX ORDER — LIDOCAINE HYDROCHLORIDE 10 MG/ML
INJECTION, SOLUTION EPIDURAL; INFILTRATION; INTRACAUDAL; PERINEURAL PRN
Status: COMPLETED | OUTPATIENT
Start: 2024-05-15 | End: 2024-05-15

## 2024-05-15 RX ORDER — MIDAZOLAM HYDROCHLORIDE 2 MG/2ML
INJECTION, SOLUTION INTRAMUSCULAR; INTRAVENOUS PRN
Status: COMPLETED | OUTPATIENT
Start: 2024-05-15 | End: 2024-05-15

## 2024-05-15 RX ADMIN — MIDAZOLAM HYDROCHLORIDE 1 MG: 1 INJECTION, SOLUTION INTRAMUSCULAR; INTRAVENOUS at 11:17

## 2024-05-15 RX ADMIN — MIDAZOLAM HYDROCHLORIDE 1 MG: 1 INJECTION, SOLUTION INTRAMUSCULAR; INTRAVENOUS at 11:08

## 2024-05-15 RX ADMIN — LIDOCAINE HYDROCHLORIDE 15 ML: 10 INJECTION, SOLUTION EPIDURAL; INFILTRATION; INTRACAUDAL; PERINEURAL at 11:17

## 2024-05-15 RX ADMIN — FENTANYL CITRATE 50 MCG: 50 INJECTION INTRAMUSCULAR; INTRAVENOUS at 11:08

## 2024-05-15 RX ADMIN — SODIUM CHLORIDE: 9 INJECTION, SOLUTION INTRAVENOUS at 08:10

## 2024-05-15 RX ADMIN — FENTANYL CITRATE 50 MCG: 50 INJECTION INTRAMUSCULAR; INTRAVENOUS at 11:17

## 2024-05-15 NOTE — PROCEDURES
RADIOLOGY POST PROCEDURE NOTE     May 15, 2024            Preoperative Diagnosis:   Abnormal LFT's    Postoperative Diagnosis:  Same.    :  Dr. Woods    Assistant:  None.    Type of Anesthesia: 1% plain lidocaine and IV moderate sedation with Versed and fentanyl.    Procedure/Description:  Image guided random liver Bx.    Findings:   No bleeding.    Estimated blood Loss:  Minimal    Specimen Removed:  Yes    Blood transfusions:  None.    Implants:  None.    Complications: None    Condition: Stable    Discharge Plan:  discharge home  if stable and no bleeding. Resume blood thinners if any in 24 hours.    Rony Woods MD

## 2024-05-15 NOTE — DISCHARGE INSTRUCTIONS
Percutaneous Liver Biopsy: What to Expect at Home  Your Recovery  A needle biopsy of the liver is a procedure to take a tiny sample (biopsy) of your liver tissue. The tissue sample is looked at under a microscope. Your doctor can look for infection or other liver problems.  You may have some pain where the biopsy needle entered your skin (the puncture site). You may also have pain in your shoulder. This is called referred pain. It is caused by pain traveling along a nerve near the biopsy site. The referred pain usually lasts less than 12 hours. You may have a small amount of bleeding from the puncture site.  You can probably go home if you have no problems after the test. You will need to take it easy at home for 1 or 2 days after the procedure. You will probably be able to return to work and most of your usual activities after that.  This care sheet gives you a general idea about how long it will take for you to recover. But each person recovers at a different pace. Follow the steps below to get better as quickly as possible.  How can you care for yourself at home?  Activity    Rest when you feel tired. Getting enough sleep will help you recover.     Try to walk each day. Start by walking a little more than you did the day before. Bit by bit, increase the amount you walk. Walking boosts blood flow and helps prevent pneumonia and constipation.     Avoid exercises that use your belly muscles and strenuous activities, such as bicycle riding, jogging, weight lifting, or aerobic exercise, for 1 week or until your doctor says it is okay.     Ask your doctor when you can drive again.     You will probably need to take 1 or 2 days off from work. It depends on the type of work you do and how you feel.     You will probably be able to shower the same day as the test, if your doctor says it is okay. Pat the puncture site dry. Do not take a bath for at least 2 days after the test, or until your doctor tells you it is

## 2024-05-15 NOTE — H&P
History and Physical    Patient: Parminder Moore Jr.           Sex: male       DOA: 5/15/2024  YOB: 1960      Age:  63 y.o.     LOS:  LOS: 0 days        HPI:     Parminder Moore Jr. is a 63 y.o. male with elevated liver enzymes presents for liver biopsy with moderate sedation     Past Medical History:   Diagnosis Date    Cat allergies     Dander    Chest pain, unspecified     Elevated cholesterol 2010    diet    Hypercalcemia     Pure hypercholesterolemia 11/21/2016 8/16 [id number], HDL88 ASCVD risk 4.7% in 10 yrs- diet good for now, no statins       Past Surgical History:   Procedure Laterality Date    FOOT/TOES SURGERY PROC UNLISTED  2/2011    [place] To[place] of left foot    ORTHOPEDIC SURGERY      prior left foot surgery 2010    PARATHYROIDECTOMY  2013    partial       Family History   Problem Relation Age of Onset    Heart Attack Neg Hx     Diabetes Maternal Grandfather 75    Dementia Other     Stroke Neg Hx        Social History     Socioeconomic History    Marital status:      Spouse name: None    Number of children: None    Years of education: None    Highest education level: None   Tobacco Use    Smoking status: Never    Smokeless tobacco: Former     Quit date: 1/31/1999   Substance and Sexual Activity    Alcohol use: Yes     Alcohol/week: 6.0 standard drinks of alcohol    Drug use: No       Prior to Admission medications    Medication Sig Start Date End Date Taking? Authorizing Provider   aspirin 81 MG EC tablet Take by mouth daily    Automatic Reconciliation, Ar   colchicine (COLCRYS) 0.6 MG tablet Take 1 tablet by mouth 3 times daily as needed 11/7/17   Automatic Reconciliation, Ar   docusate sodium (DOK) 100 MG capsule TK 1 C PO BID 9/11/17   Automatic Reconciliation, Ar   ibuprofen (ADVIL;MOTRIN) 800 MG tablet Take 1 tablet by mouth every 8 hours as needed 11/7/17   Automatic Reconciliation, Ar   sertraline (ZOLOFT) 50 MG tablet TAKE 1 TABLET BY MOUTH DAILY  Patient not

## 2024-05-15 NOTE — PROGRESS NOTES
TRANSFER - OUT REPORT:    Verbal report given to PUNEET Dimas (name) on Parminder Moore Jr. being transferred to phase II (unit) for routine post-op       Report consisted of patient's Situation, Background, Assessment and   Recommendations(SBAR).     Information from the following report(s) Nurse Handoff Report was reviewed with the receiving nurse.    Opportunity for questions and clarification was provided.      Patient transported with:   Registered Nurse

## 2025-04-03 ENCOUNTER — OFFICE VISIT (OUTPATIENT)
Age: 65
End: 2025-04-03

## 2025-04-03 VITALS
HEIGHT: 66 IN | WEIGHT: 163.2 LBS | DIASTOLIC BLOOD PRESSURE: 81 MMHG | TEMPERATURE: 97.8 F | OXYGEN SATURATION: 97 % | BODY MASS INDEX: 26.23 KG/M2 | HEART RATE: 51 BPM | SYSTOLIC BLOOD PRESSURE: 131 MMHG

## 2025-04-03 DIAGNOSIS — R03.0 ELEVATED BLOOD PRESSURE READING: ICD-10-CM

## 2025-04-03 DIAGNOSIS — L23.7 POISON IVY: Primary | ICD-10-CM

## 2025-04-03 RX ORDER — ROSUVASTATIN CALCIUM 5 MG/1
TABLET, COATED ORAL
COMMUNITY

## 2025-04-03 RX ORDER — TRIAMCINOLONE ACETONIDE 1 MG/G
OINTMENT TOPICAL 2 TIMES DAILY
Qty: 30 G | Refills: 2 | Status: SHIPPED | OUTPATIENT
Start: 2025-04-03 | End: 2025-04-10

## 2025-04-03 RX ORDER — PREDNISONE 10 MG/1
10 TABLET ORAL DAILY
Qty: 5 TABLET | Refills: 0 | Status: SHIPPED | OUTPATIENT
Start: 2025-04-03 | End: 2025-04-08

## 2025-04-03 RX ORDER — HYDROXYZINE HYDROCHLORIDE 25 MG/1
12.5 TABLET, FILM COATED ORAL EVERY 8 HOURS PRN
Qty: 30 TABLET | Refills: 0 | Status: SHIPPED | OUTPATIENT
Start: 2025-04-03

## 2025-04-03 NOTE — PATIENT INSTRUCTIONS
Follow up with PCP within 7 days or sooner   For any new or worsening sx please seek medical attention in the ED   Wash your hands frequently to avoid spread of poison ivy     DASH Diet: After Your Visit  Your Care Instructions  The DASH diet is an eating plan that can help lower your blood pressure. DASH stands for Dietary Approaches to Stop Hypertension. Hypertension is high blood pressure.  The DASH diet focuses on eating foods that are high in calcium, potassium, and magnesium. These nutrients can lower blood pressure. The foods that are highest in these nutrients are fruits, vegetables, low-fat dairy products, nuts, seeds, and legumes. But taking calcium, potassium, and magnesium supplements instead of eating foods that are high in those nutrients does not have the same effect. The DASH diet also includes whole grains, fish, and poultry.  The DASH diet is one of several lifestyle changes your doctor may recommend to lower your high blood pressure. Your doctor may also want you to decrease the amount of sodium in your diet. Lowering sodium while following the DASH diet can lower blood pressure even further than just the DASH diet alone.  Follow-up care is a key part of your treatment and safety. Be sure to make and go to all appointments, and call your doctor if you are having problems. It’s also a good idea to know your test results and keep a list of the medicines you take.  How can you care for yourself at home?  Following the DASH diet  Eat 4 to 5 servings of fruit each day. A serving is 1 medium-sized piece of fruit, ½ cup chopped or canned fruit, 1/4 cup dried fruit, or 4 ounces (½ cup) of fruit juice. Choose fruit more often than fruit juice.  Eat 4 to 5 servings of vegetables each day. A serving is 1 cup of lettuce or raw leafy vegetables, ½ cup of chopped or cooked vegetables, or 4 ounces (½ cup) of vegetable juice. Choose vegetables more often than vegetable juice.  Get 2 to 3 servings of low-fat and

## 2025-04-03 NOTE — PROGRESS NOTES
Subjective     Chief Complaint   Patient presents with    Poison Ivy     Poison ivy, eye swollen, arms, both hands, scrotum          Poison Leisa    64 year old male presented to rash to bilateral arms, abdomen with L eye periorbital swelling x2 days after working in the [employer]. Patient reports touching poison ivy multiple times, denies any OTC medication use.     Past Medical History:   Diagnosis Date    Cat allergies     Dander    Chest pain, unspecified     Elevated cholesterol 2010    diet    Hypercalcemia     Pure hypercholesterolemia 11/21/2016 8/16 [id number], HDL88 ASCVD risk 4.7% in 10 yrs- diet good for now, no statins       Past Surgical History:   Procedure Laterality Date    CT NEEDLE BIOPSY LIVER PERCUTANEOUS  5/15/2024    CT NEEDLE BIOPSY LIVER PERCUTANEOUS 5/15/2024 [facility] RAD CT    FOOT/TOES SURGERY PROC UNLISTED  2/2011    [place] To[place] of left foot    ORTHOPEDIC SURGERY      prior left foot surgery 2010    PARATHYROIDECTOMY  2013    partial       Family History   Problem Relation Age of Onset    Heart Attack Neg Hx     Diabetes Maternal Grandfather 75    Dementia Other     Stroke Neg Hx        No Known Allergies    Social History     Tobacco Use    Smoking status: Never    Smokeless tobacco: Former     Quit date: 1/31/1999   Substance Use Topics    Alcohol use: Yes     Alcohol/week: 6.0 standard drinks of alcohol    Drug use: No       There were no vitals filed for this visit.    Objective     Physical Exam  Constitutional:       Appearance: Normal appearance.   HENT:      Head: Normocephalic.      Right Ear: Tympanic membrane normal.      Left Ear: Tympanic membrane normal.      Nose: Nose normal.      Mouth/Throat:      Mouth: Mucous membranes are moist.      Pharynx: Oropharynx is clear.   Eyes:      Extraocular Movements: Extraocular movements intact.      Conjunctiva/sclera: Conjunctivae normal.      Pupils: Pupils are equal, round, and reactive to light.      Comments: L eye with orbital swelling